# Patient Record
Sex: FEMALE | Race: WHITE | NOT HISPANIC OR LATINO | Employment: OTHER | URBAN - METROPOLITAN AREA
[De-identification: names, ages, dates, MRNs, and addresses within clinical notes are randomized per-mention and may not be internally consistent; named-entity substitution may affect disease eponyms.]

---

## 2017-02-03 DIAGNOSIS — E03.9 HYPOTHYROIDISM, UNSPECIFIED TYPE: ICD-10-CM

## 2017-02-03 DIAGNOSIS — E11.9 TYPE 2 DIABETES MELLITUS WITHOUT COMPLICATION, WITHOUT LONG-TERM CURRENT USE OF INSULIN (HCC): ICD-10-CM

## 2017-02-03 RX ORDER — CYCLOBENZAPRINE HCL 10 MG
10 TABLET ORAL 3 TIMES DAILY PRN
Qty: 90 TAB | Refills: 1 | Status: SHIPPED | OUTPATIENT
Start: 2017-02-03 | End: 2017-03-02 | Stop reason: SDUPTHER

## 2017-02-03 RX ORDER — GLIPIZIDE 5 MG/1
5 TABLET ORAL 2 TIMES DAILY
Qty: 180 TAB | Refills: 1 | Status: SHIPPED | OUTPATIENT
Start: 2017-02-03 | End: 2017-06-23

## 2017-02-03 NOTE — TELEPHONE ENCOUNTER
Refill done.  Pt needs to have fasting labs done and schedule a follow up appointment.  Labs ordered.  Kang Lofton M.D.

## 2017-02-03 NOTE — TELEPHONE ENCOUNTER
Was the patient seen in the last year in this department? No 9/16/2015    Does patient have an active prescription for medications requested? No     Received Request Via: Pharmacy

## 2017-02-03 NOTE — TELEPHONE ENCOUNTER
From: Mrogan Solis  To: Kang Lofton M.D.  Sent: 2/3/2017 12:47 PM PST  Subject: Medication Renewal Request    Original authorizing provider: KHUSHI Silver would like a refill of the following medications:  glipiZIDE (GLUCOTROL) 5 MG Tab [Kang Lofton M.D.]  cyclobenzaprine (FLEXERIL) 10 MG Tab [Kang Lofton M.D.]    Preferred pharmacy: [facility] [address] [facility] ([place] [place] [address]    Comment:

## 2017-03-02 ENCOUNTER — PATIENT MESSAGE (OUTPATIENT)
Dept: MEDICAL GROUP | Facility: MEDICAL CENTER | Age: 72
End: 2017-03-02

## 2017-03-02 DIAGNOSIS — M51.36 DDD (DEGENERATIVE DISC DISEASE), LUMBAR: ICD-10-CM

## 2017-03-02 RX ORDER — TRAMADOL HYDROCHLORIDE 50 MG/1
50 TABLET ORAL 2 TIMES DAILY PRN
Qty: 120 TAB | Refills: 2 | Status: SHIPPED
Start: 2017-03-02 | End: 2019-01-24

## 2017-03-02 RX ORDER — CYCLOBENZAPRINE HCL 10 MG
10 TABLET ORAL 3 TIMES DAILY PRN
Qty: 90 TAB | Refills: 2 | Status: ON HOLD | OUTPATIENT
Start: 2017-03-02 | End: 2017-07-06

## 2017-03-02 NOTE — TELEPHONE ENCOUNTER
Was the patient seen in the last year in this department? no    Does patient have an active prescription for medications requested? No     Received Request Via: Patient

## 2017-03-02 NOTE — TELEPHONE ENCOUNTER
From: Morgan Solis  To: Kang Lofton M.D.  Sent: 3/2/2017 8:21 AM PST  Subject: Medication Renewal Request    Original authorizing provider: KHUSHI Silver would like a refill of the following medications:  tramadol (ULTRAM) 50 MG Tab [Kang Lofton M.D.]    Preferred pharmacy: [facility] - [place] ([place]), [place] - [address]    Comment:

## 2017-03-28 ENCOUNTER — OFFICE VISIT (OUTPATIENT)
Dept: MEDICAL GROUP | Facility: MEDICAL CENTER | Age: 72
End: 2017-03-28
Payer: MEDICARE

## 2017-03-28 VITALS
HEART RATE: 106 BPM | RESPIRATION RATE: 16 BRPM | BODY MASS INDEX: 31.34 KG/M2 | TEMPERATURE: 96.4 F | OXYGEN SATURATION: 92 % | HEIGHT: 66 IN | WEIGHT: 195 LBS | DIASTOLIC BLOOD PRESSURE: 88 MMHG | SYSTOLIC BLOOD PRESSURE: 138 MMHG

## 2017-03-28 DIAGNOSIS — I10 ESSENTIAL HYPERTENSION: ICD-10-CM

## 2017-03-28 DIAGNOSIS — F51.01 PRIMARY INSOMNIA: ICD-10-CM

## 2017-03-28 DIAGNOSIS — E78.00 PURE HYPERCHOLESTEROLEMIA: ICD-10-CM

## 2017-03-28 DIAGNOSIS — E03.9 HYPOTHYROIDISM, UNSPECIFIED TYPE: ICD-10-CM

## 2017-03-28 DIAGNOSIS — E11.9 TYPE 2 DIABETES MELLITUS WITHOUT COMPLICATION, WITHOUT LONG-TERM CURRENT USE OF INSULIN (HCC): ICD-10-CM

## 2017-03-28 DIAGNOSIS — M25.552 LEFT HIP PAIN: ICD-10-CM

## 2017-03-28 PROCEDURE — 99214 OFFICE O/P EST MOD 30 MIN: CPT | Performed by: FAMILY MEDICINE

## 2017-03-28 PROCEDURE — 1101F PT FALLS ASSESS-DOCD LE1/YR: CPT | Performed by: FAMILY MEDICINE

## 2017-03-28 PROCEDURE — 1036F TOBACCO NON-USER: CPT | Performed by: FAMILY MEDICINE

## 2017-03-28 PROCEDURE — 4040F PNEUMOC VAC/ADMIN/RCVD: CPT | Mod: 8P | Performed by: FAMILY MEDICINE

## 2017-03-28 PROCEDURE — 3014F SCREEN MAMMO DOC REV: CPT | Mod: 8P | Performed by: FAMILY MEDICINE

## 2017-03-28 PROCEDURE — G8417 CALC BMI ABV UP PARAM F/U: HCPCS | Performed by: FAMILY MEDICINE

## 2017-03-28 PROCEDURE — 3046F HEMOGLOBIN A1C LEVEL >9.0%: CPT | Mod: 8P | Performed by: FAMILY MEDICINE

## 2017-03-28 PROCEDURE — G8484 FLU IMMUNIZE NO ADMIN: HCPCS | Performed by: FAMILY MEDICINE

## 2017-03-28 PROCEDURE — G8432 DEP SCR NOT DOC, RNG: HCPCS | Performed by: FAMILY MEDICINE

## 2017-03-28 RX ORDER — LISINOPRIL 10 MG/1
10 TABLET ORAL DAILY
Qty: 90 TAB | Refills: 1 | Status: SHIPPED | OUTPATIENT
Start: 2017-03-28 | End: 2017-06-23

## 2017-03-28 RX ORDER — TRAZODONE HYDROCHLORIDE 50 MG/1
50 TABLET ORAL
Qty: 90 TAB | Refills: 1 | Status: SHIPPED | OUTPATIENT
Start: 2017-03-28 | End: 2017-06-23

## 2017-03-28 RX ORDER — LEVOTHYROXINE SODIUM 0.05 MG/1
TABLET ORAL
Qty: 90 TAB | Refills: 0 | Status: SHIPPED | OUTPATIENT
Start: 2017-03-28 | End: 2019-01-24

## 2017-03-28 RX ORDER — ATORVASTATIN CALCIUM 20 MG/1
20 TABLET, FILM COATED ORAL
Qty: 90 TAB | Refills: 1 | Status: SHIPPED | OUTPATIENT
Start: 2017-03-28 | End: 2018-05-04 | Stop reason: SDUPTHER

## 2017-03-28 ASSESSMENT — PATIENT HEALTH QUESTIONNAIRE - PHQ9: CLINICAL INTERPRETATION OF PHQ2 SCORE: 0

## 2017-03-28 NOTE — ASSESSMENT & PLAN NOTE
Patient is on levothyroxine 50 mg daily, but she admits to skipping the medication occasionally in the morning, because she forgets.

## 2017-03-28 NOTE — ASSESSMENT & PLAN NOTE
July 6th she is having her hip replaced.  Had a new set of x-ray, which showed advanced osteoarthritis.  She is seeing Dr. Jason Zepeda.

## 2017-03-28 NOTE — ASSESSMENT & PLAN NOTE
Patient has not had follow-up blood work in almost 2 years. At that time her A1c was 7.4.  Patient states she takes metformin glipizide only when her blood sugar is over 150, which is about one or 2 times a week. There are times where she needs it more often when she gets cortisone injections from her pain specialist.  Patient denies any side effects from diabetes.

## 2017-03-28 NOTE — MR AVS SNAPSHOT
"Morgan Solis   3/28/2017 10:00 AM   Office Visit   MRN: [id number]    Department:  [facility]   Dept Phone:  [phone]    Description:  Female : 1945   Provider:  Kang Lofton M.D.           Reason for Visit     Blood Sugar Problem Follow up       Allergies as of 3/28/2017     Allergen Noted Reactions    Anesthetics, Sarah [Esters] 07/10/2015       Throat swelling and severe nasal discharge.  Will need atropine and benadryl.    Cephapirin 07/10/2015       Codeine 07/10/2015       Mobic [Meloxicam] 2017   Anxiety    Felt tingling in her extremities and anxiety.       You were diagnosed with     Type 2 diabetes mellitus without complication, without long-term current use of insulin (CMS-[facility])   [5381765]       Pure hypercholesterolemia   [272.0.ICD-9-CM]       Hypothyroidism, unspecified type   [1938711]       Essential hypertension   [5071291]       Left hip pain   [349141]       Primary insomnia   [835559]         Vital Signs     Blood Pressure Pulse Temperature Respirations Height Weight    138/88 mmHg 106 35.8 °C (96.4 °F) 16 1.676 m (5' 6\") 88.451 kg (195 lb)    Body Mass Index Oxygen Saturation Smoking Status             31.49 kg/m2 92% Former Smoker         Basic Information     Date Of Birth Sex Race Ethnicity Preferred Language    1945 Female White Non- English      Problem List              ICD-10-CM Priority Class Noted - Resolved    Hx of tobacco use, presenting hazards to health Z87.891   7/10/2015 - Present    Type 2 diabetes mellitus without complication (CMS-[facility]) E11.9   7/10/2015 - Present    Essential hypertension I10   7/10/2015 - Present    Hx of pancreatitis Z87.19   7/10/2015 - Present    DDD (degenerative disc disease), lumbar M51.36   7/10/2015 - Present    Left hip pain M25.552   7/10/2015 - Present    Obesity (BMI 30.0-34.9) E66.9   7/10/2015 - Present    Hypothyroidism E03.9   2015 - Present    Hyperlipidemia E78.5   2015 - Present "    Insomnia G47.00   6/30/2016 - Present      Health Maintenance        Date Due Completion Dates    DIABETES MONOFILAMENT / LE EXAM 5/26/1946 ---    RETINAL SCREENING 11/26/1965 ---    URINE ACR / MICROALBUMIN 11/26/1965 ---    IMM DTaP/Tdap/Td Vaccine (1 - Tdap) 11/26/1966 ---    IMM ZOSTER VACCINE 11/26/2007 ---    BONE DENSITY 11/26/2012 ---    IMM PNEUMOCOCCAL 65+ (ADULT) LOW/MEDIUM RISK SERIES (1 of 2 - PCV13) 11/26/2012 ---    A1C SCREENING 2/11/2016 8/11/2015    FASTING LIPID PROFILE 8/11/2016 8/11/2015    SERUM CREATININE 8/11/2016 8/11/2015    IMM INFLUENZA (1) 9/1/2016 10/27/2015    MAMMOGRAM 12/6/2017 (Originally 11/26/1987) ---    COLONOSCOPY 12/6/2017 (Originally 11/26/1997) ---            Current Immunizations     Influenza Vaccine Adult HD 10/27/2015      Below and/or attached are the medications your provider expects you to take. Review all of your home medications and newly ordered medications with your provider and/or pharmacist. Follow medication instructions as directed by your provider and/or pharmacist. Please keep your medication list with you and share with your provider. Update the information when medications are discontinued, doses are changed, or new medications (including over-the-counter products) are added; and carry medication information at all times in the event of emergency situations     Allergies:  ANESTHETICS, MIC - (reactions not documented)     CEPHAPIRIN - (reactions not documented)     CODEINE - (reactions not documented)     MOBIC - Anxiety               Medications  Valid as of: March 28, 2017 - 10:42 AM    Generic Name Brand Name Tablet Size Instructions for use    Atorvastatin Calcium (Tab) LIPITOR 20 MG Take 1 Tab by mouth every bedtime.        Blood Glucose Monitoring Suppl (Misc) Blood Glucose Monitoring Suppl SUPPLIES Test strips order: Test strips for One Touch Ultra 2 meter. Sig: use twice daily. Dx: E11.9        Cyclobenzaprine HCl (Tab) FLEXERIL 10 MG Take 1  Tab by mouth 3 times a day as needed for Moderate Pain or Muscle Spasms.        Diclofenac   Take  by mouth.        GlipiZIDE (Tab) GLUCOTROL 5 MG Take 1 Tab by mouth 2 times a day.        Lancets (Misc) Lancets  Lancets order: Lancets for One Touch Ultra 2 meter. Sig: use once daily. Dx: E11.9        Levothyroxine Sodium (Tab) SYNTHROID 50 MCG TAKE ONE TABLET BY MOUTH ONCE DAILY IN THE MORNING ON EMPTY STOMACH        Lisinopril (Tab) PRINIVIL 10 MG Take 1 Tab by mouth every day.        MetFORMIN HCl (Tab) GLUCOPHAGE 500 MG Take 1 Tab by mouth 2 times a day, with meals.        TraMADol HCl (Tab) ULTRAM 50 MG Take 1 Tab by mouth 2 times a day as needed for Severe Pain.        TraZODone HCl (Tab) DESYREL 50 MG Take 1 Tab by mouth at bedtime as needed for Sleep.        .                 Medicines prescribed today were sent to:     [facility] [address] [place] [address] (S), [place] [address] [place] [address] [facility] ([place]) [place] [address]    Phone: [phone] Fax: [phone]    Open 24 Hours?: No      Medication refill instructions:       If your prescription bottle indicates you have medication refills left, it is not necessary to call your provider’s office. Please contact your pharmacy and they will refill your medication.    If your prescription bottle indicates you do not have any refills left, you may request refills at any time through one of the following ways: The online [employer] system (except Urgent Care), by calling your provider’s office, or by asking your pharmacy to contact your provider’s office with a refill request. Medication refills are processed only during regular business hours and may not be available until the next business day. Your provider may request additional information or to have a follow-up visit with you prior to refilling your medication.   *Please Note: Medication refills are assigned a new Rx number when refilled electronically. Your pharmacy may indicate that no refills were authorized even  though a new prescription for the same medication is available at the pharmacy. Please request the medicine by name with the pharmacy before contacting your provider for a refill.        Your To Do List     Future Labs/Procedures Complete By Expires    COMP METABOLIC PANEL  As directed 3/29/2018    HEMOGLOBIN A1C  As directed 3/29/2018    LIPID PROFILE  As directed 3/29/2018    MICROALBUMIN CREAT RATIO URINE  As directed 3/29/2018    TSH WITH REFLEX TO FT4  As directed 3/28/2018         MyChart Access Code: Activation code not generated  Current [employer] Status: Active

## 2017-04-05 ENCOUNTER — PATIENT MESSAGE (OUTPATIENT)
Dept: MEDICAL GROUP | Facility: MEDICAL CENTER | Age: 72
End: 2017-04-05

## 2017-04-05 RX ORDER — FLUTICASONE PROPIONATE 50 MCG
2 SPRAY, SUSPENSION (ML) NASAL DAILY
Qty: 16 G | Refills: 3 | Status: SHIPPED | OUTPATIENT
Start: 2017-04-05 | End: 2017-06-23

## 2017-04-05 NOTE — TELEPHONE ENCOUNTER
From: Morgan Solis  To: Kang Lofton M.D.  Sent: 4/5/2017 11:49 AM PDT  Subject: Prescription Question    Dr. Lofton,    When I was in your office last week we discussed my taking Claratin for my allergy. Unfortunately, I have given the med a week and I am still suffering from sneezing, runny nose and sinus pressure. Is there anything else you can recommend?    BTW....I saw Dr. Cardenas yesterday and he gave me several cortisone injections. I expected the injections to affect my blood sugar level and it did. I am going to wait until my levels resume to normal to have the tests you requested.    Morgan

## 2017-04-27 ENCOUNTER — PATIENT MESSAGE (OUTPATIENT)
Dept: MEDICAL GROUP | Facility: MEDICAL CENTER | Age: 72
End: 2017-04-27

## 2017-04-27 DIAGNOSIS — R30.0 DYSURIA: ICD-10-CM

## 2017-04-27 RX ORDER — SULFAMETHOXAZOLE AND TRIMETHOPRIM 800; 160 MG/1; MG/1
1 TABLET ORAL 2 TIMES DAILY
Qty: 10 TAB | Refills: 0 | Status: SHIPPED | OUTPATIENT
Start: 2017-04-27 | End: 2017-05-02

## 2017-05-09 ENCOUNTER — TELEPHONE (OUTPATIENT)
Dept: MEDICAL GROUP | Facility: MEDICAL CENTER | Age: 72
End: 2017-05-09

## 2017-05-09 DIAGNOSIS — E11.9 TYPE 2 DIABETES MELLITUS WITHOUT COMPLICATION, WITHOUT LONG-TERM CURRENT USE OF INSULIN (HCC): ICD-10-CM

## 2017-05-09 RX ORDER — LANCETS 30 GAUGE
EACH MISCELLANEOUS
Qty: 100 EACH | Refills: 3 | Status: ON HOLD | OUTPATIENT
Start: 2017-05-09 | End: 2017-07-06

## 2017-05-09 NOTE — TELEPHONE ENCOUNTER
1. Caller Name: Elisa with [employer]                      Call Back Number: [phone]    2. Message: [employer] called stating the preferred formulary for test strips are Countour, Freestyle, or Precision. Is there a reason why pt needs One Touch?    3. Patient approves office to leave a detailed voicemail/MyChart message: yes

## 2017-05-09 NOTE — TELEPHONE ENCOUNTER
Please notify patient that her insurance preferred glucometer is contour. Is it possible if we send in a new meter, strips and lancets?  Kang Lofton M.D.

## 2017-05-09 NOTE — TELEPHONE ENCOUNTER
Pt states she is willing to switch to Contour. She would like new meter, strips and lancets sent to [facility].

## 2017-05-30 ENCOUNTER — TELEPHONE (OUTPATIENT)
Dept: HEALTH INFORMATION MANAGEMENT | Facility: OTHER | Age: 72
End: 2017-05-30

## 2017-05-30 ENCOUNTER — PATIENT OUTREACH (OUTPATIENT)
Dept: HEALTH INFORMATION MANAGEMENT | Facility: OTHER | Age: 72
End: 2017-05-30

## 2017-05-30 RX ORDER — TURMERIC ROOT EXTRACT 500 MG
1 TABLET ORAL
COMMUNITY
End: 2019-06-04

## 2017-05-30 RX ORDER — KRILL/OM-3/DHA/EPA/PHOSPHO/AST 500MG-86MG
1 CAPSULE ORAL
COMMUNITY
End: 2019-06-04

## 2017-05-30 NOTE — TELEPHONE ENCOUNTER
Dear Dr. Lofton,     I had the pleasure to review medications with your patient Morgan. Morgan would like your input on the recommendation of taking low-dose aspirin daily for primary prevention of cardiovascular disease. Please advise.     Thank you,     Ronel Coates, PHARMD  [facility] Clinical Pharmacist  [address] [place] [address] [id number] [phone]

## 2017-05-30 NOTE — PROGRESS NOTES
Outbound call to Morgan for medication reconciliation.    Updated allergy and medication lists.    Patient demonstrates partial adherence to medication schedule and understanding of indications for medications. Morgan states that she only takes glipizide when her blood sugars are over 120. She monitors her blood sugars in the mornings and afternoons. She states readings have been around 103-110. She is hesitant to take glipizide regularly due to hypoglycemia. Patient is not interested in diabetic education classes at this time. She had taken a class in [place].   Patient only takes lisinopril if her BP > 135/85. I advised patient that lisinopril is important to take for DM for kidney protection. If BP is too low, PCP can consider reducing dosage. Patient monitors BP once daily and reports readings <120/80. Most recent home reading was 103/66.    Morgan reports missing her thyroid medication 2 to 3 times weekly because it has to be taken on empty stomach. I advised patient it is best to take 30 to 60 minutes before breakfast but could also be taken 2 hours after a meal.      Medications that meet Beer's criteria for potentially inappropriate use in patients over 65 include:  Cyclobenzaprine - patient reports taking for hip pain. She typically takes one at bedtime but uses up to three times daily if having significant muscle pain. She denies dizziness or drowsiness.   She is hoping to be off of the cyclobenzaprine, tramadol, and diclofenac after having hip surgery which is scheduled 7/6/17.     Morgan states that she does not like to take trazodone unless she absolutely has to. She states that she feels groggy the next day. She reports using this about once weekly. I recommended patient can try melatonin 3 mg about 30-60 minutes before bed.     Patient feels satisfied with medication regimen.      Patient can afford her medications.    Morgan does not use tobacco.     Patient has had tetanus vaccination in  last 10 years, discussed patient getting tdap for next booster dose.     Calculated CrCl = 74.2 mL/min. Medications dosed appropriately.     Plan:    Recommend Prevnar and shingles vaccinations. Patient is amenable.     Discussed importance of taking medications as prescribed. Reviewed with patient signs/symptoms of hypoglycemia and to treat with a form of glucose if FBS <70.      Advised patient to get lab work that was ordered by PCP. Patient states she plans on getting in the next week or two.     Sent message to PCP to consider addition of low-dose aspirin for primary prevention of cardiovascular disease.     Discussed sleep hygiene techniques and trial of melatonin for insomnia.     Ronel Coates, PHARMD

## 2017-05-31 ENCOUNTER — TELEPHONE (OUTPATIENT)
Dept: MEDICAL GROUP | Facility: MEDICAL CENTER | Age: 72
End: 2017-05-31

## 2017-05-31 NOTE — TELEPHONE ENCOUNTER
Please call patient to recommend aspirin 81 mg daily to prevent against heart attack and stroke. She should take the aspirin with food.  Kang Lofton M.D.

## 2017-06-09 ENCOUNTER — PATIENT OUTREACH (OUTPATIENT)
Dept: HEALTH INFORMATION MANAGEMENT | Facility: OTHER | Age: 72
End: 2017-06-09

## 2017-06-09 NOTE — PROGRESS NOTES
Received notification from Dr. Lofton to recommend patient start taking aspirin 81 mg daily to prevent against stroke and heart attack. Outbound call to Morgan. Morgan was unavailable. Left my contact information, requesting patient to call back.     Ronel Coates, NICKD

## 2017-06-09 NOTE — PROGRESS NOTES
Morgan returned call and verified that she has begun taking ASA 81 mg daily.     Ronel Coates, PHARMD

## 2017-06-23 DIAGNOSIS — Z01.810 PRE-OPERATIVE CARDIOVASCULAR EXAMINATION: ICD-10-CM

## 2017-06-23 DIAGNOSIS — Z01.812 PRE-OPERATIVE LABORATORY EXAMINATION: ICD-10-CM

## 2017-06-23 LAB
ANION GAP SERPL CALC-SCNC: 7 MMOL/L (ref 0–11.9)
APPEARANCE UR: ABNORMAL
BACTERIA #/AREA URNS HPF: ABNORMAL /HPF
BASOPHILS # BLD AUTO: 1.9 % (ref 0–1.8)
BASOPHILS # BLD: 0.15 K/UL (ref 0–0.12)
BILIRUB UR QL STRIP.AUTO: NEGATIVE
BUN SERPL-MCNC: 20 MG/DL (ref 8–22)
CALCIUM SERPL-MCNC: 9.6 MG/DL (ref 8.5–10.5)
CHLORIDE SERPL-SCNC: 102 MMOL/L (ref 96–112)
CO2 SERPL-SCNC: 27 MMOL/L (ref 20–33)
COLOR UR: YELLOW
CREAT SERPL-MCNC: 0.82 MG/DL (ref 0.5–1.4)
CULTURE IF INDICATED INDCX: YES UA CULTURE
EKG IMPRESSION: NORMAL
EOSINOPHIL # BLD AUTO: 0.49 K/UL (ref 0–0.51)
EOSINOPHIL NFR BLD: 6.1 % (ref 0–6.9)
EPI CELLS #/AREA URNS HPF: ABNORMAL /HPF
ERYTHROCYTE [DISTWIDTH] IN BLOOD BY AUTOMATED COUNT: 41 FL (ref 35.9–50)
GFR SERPL CREATININE-BSD FRML MDRD: >60 ML/MIN/1.73 M 2
GLUCOSE SERPL-MCNC: 286 MG/DL (ref 65–99)
GLUCOSE UR STRIP.AUTO-MCNC: >1000 MG/DL
HCT VFR BLD AUTO: 41.4 % (ref 37–47)
HGB BLD-MCNC: 13.8 G/DL (ref 12–16)
HIV 1+2 AB+HIV1 P24 AG SERPL QL IA: NON REACTIVE
IMM GRANULOCYTES # BLD AUTO: 0.03 K/UL (ref 0–0.11)
IMM GRANULOCYTES NFR BLD AUTO: 0.4 % (ref 0–0.9)
KETONES UR STRIP.AUTO-MCNC: ABNORMAL MG/DL
LEUKOCYTE ESTERASE UR QL STRIP.AUTO: ABNORMAL
LYMPHOCYTES # BLD AUTO: 2.15 K/UL (ref 1–4.8)
LYMPHOCYTES NFR BLD: 26.7 % (ref 22–41)
MCH RBC QN AUTO: 30.4 PG (ref 27–33)
MCHC RBC AUTO-ENTMCNC: 33.3 G/DL (ref 33.6–35)
MCV RBC AUTO: 91.2 FL (ref 81.4–97.8)
MICRO URNS: ABNORMAL
MONOCYTES # BLD AUTO: 0.55 K/UL (ref 0–0.85)
MONOCYTES NFR BLD AUTO: 6.8 % (ref 0–13.4)
MUCOUS THREADS #/AREA URNS HPF: ABNORMAL /HPF
NEUTROPHILS # BLD AUTO: 4.69 K/UL (ref 2–7.15)
NEUTROPHILS NFR BLD: 58.1 % (ref 44–72)
NITRITE UR QL STRIP.AUTO: NEGATIVE
NRBC # BLD AUTO: 0 K/UL
NRBC BLD AUTO-RTO: 0 /100 WBC
PH UR STRIP.AUTO: 6 [PH]
PLATELET # BLD AUTO: 312 K/UL (ref 164–446)
PMV BLD AUTO: 9.5 FL (ref 9–12.9)
POTASSIUM SERPL-SCNC: 5 MMOL/L (ref 3.6–5.5)
PROT UR QL STRIP: 30 MG/DL
RBC # BLD AUTO: 4.54 M/UL (ref 4.2–5.4)
RBC # URNS HPF: ABNORMAL /HPF
RBC UR QL AUTO: ABNORMAL
SCCMEC + MECA PNL NOSE NAA+PROBE: NEGATIVE
SCCMEC + MECA PNL NOSE NAA+PROBE: NEGATIVE
SODIUM SERPL-SCNC: 136 MMOL/L (ref 135–145)
SP GR UR STRIP.AUTO: 1.02
WBC # BLD AUTO: 8.1 K/UL (ref 4.8–10.8)
WBC #/AREA URNS HPF: ABNORMAL /HPF

## 2017-06-23 PROCEDURE — 87086 URINE CULTURE/COLONY COUNT: CPT

## 2017-06-23 PROCEDURE — 87640 STAPH A DNA AMP PROBE: CPT | Mod: 59

## 2017-06-23 PROCEDURE — 81001 URINALYSIS AUTO W/SCOPE: CPT

## 2017-06-23 PROCEDURE — 87186 SC STD MICRODIL/AGAR DIL: CPT

## 2017-06-23 PROCEDURE — 93010 ELECTROCARDIOGRAM REPORT: CPT | Performed by: INTERNAL MEDICINE

## 2017-06-23 PROCEDURE — 87389 HIV-1 AG W/HIV-1&-2 AB AG IA: CPT

## 2017-06-23 PROCEDURE — 85025 COMPLETE CBC W/AUTO DIFF WBC: CPT

## 2017-06-23 PROCEDURE — 87641 MR-STAPH DNA AMP PROBE: CPT

## 2017-06-23 PROCEDURE — 36415 COLL VENOUS BLD VENIPUNCTURE: CPT

## 2017-06-23 PROCEDURE — 87077 CULTURE AEROBIC IDENTIFY: CPT

## 2017-06-23 PROCEDURE — 93005 ELECTROCARDIOGRAM TRACING: CPT

## 2017-06-23 PROCEDURE — 80048 BASIC METABOLIC PNL TOTAL CA: CPT

## 2017-06-23 RX ORDER — LISINOPRIL 10 MG/1
10 TABLET ORAL
COMMUNITY
End: 2018-09-27

## 2017-06-23 RX ORDER — DICLOFENAC SODIUM 75 MG/1
75 TABLET, DELAYED RELEASE ORAL 2 TIMES DAILY
COMMUNITY
End: 2019-06-04

## 2017-06-23 RX ORDER — GLIPIZIDE 5 MG/1
5 TABLET ORAL 2 TIMES DAILY PRN
COMMUNITY
End: 2018-05-04

## 2017-06-23 NOTE — OR NURSING
"Pt here to register for surgery on 7-6-17. Pt states that she is allergic to anesthetics and that it causes throat swelling and severe sinus draining. \"I feel like I am drowning.\" Pt states that she needs atropine and benadryl in pre op prior to surgery and that intubation must be through mouth and not nose. [facility] notified.   "

## 2017-06-23 NOTE — DISCHARGE PLANNING
DISCHARGE PLANNING NOTE - TOTAL JOINT     Procedure: Procedure(s):  HIP ARTHROPLASTY ANTERIOR TOTAL  Procedure Date: 7/6/2017  Insurance:  Payor: SENIOR CARE PLUS / Plan: SENIOR CARE PLUS  Equipment currently available at home? None  Steps into the home? 2 with pole nearby to assist  Steps within the home? 0  Toilet height? Standard  Type of shower? walk-in shower  Who will be with you during your recovery? other: Daughter  Is Outpatient Physical Therapy set up after surgery? No   Did you take the Total Joint Class and where? Yes, at [facility]     Plan: Patient does not want to purchase equipment. Reviewed Equipment Resource list and advised her to go to [facility] for a FWW, and shower chair. There are no other anticipated discharge needs.

## 2017-06-25 LAB
BACTERIA UR CULT: ABNORMAL
BACTERIA UR CULT: ABNORMAL
SIGNIFICANT IND 70042: ABNORMAL
SITE SITE: ABNORMAL
SOURCE SOURCE: ABNORMAL

## 2017-07-06 ENCOUNTER — APPOINTMENT (OUTPATIENT)
Dept: RADIOLOGY | Facility: MEDICAL CENTER | Age: 72
DRG: 470 | End: 2017-07-06
Attending: ORTHOPAEDIC SURGERY
Payer: MEDICARE

## 2017-07-06 ENCOUNTER — HOSPITAL ENCOUNTER (INPATIENT)
Facility: MEDICAL CENTER | Age: 72
LOS: 1 days | DRG: 470 | End: 2017-07-07
Attending: ORTHOPAEDIC SURGERY | Admitting: ORTHOPAEDIC SURGERY
Payer: MEDICARE

## 2017-07-06 PROBLEM — M16.12 OSTEOARTHRITIS OF LEFT HIP: Status: ACTIVE | Noted: 2017-07-06

## 2017-07-06 LAB
GLUCOSE BLD-MCNC: 148 MG/DL (ref 65–99)
GLUCOSE BLD-MCNC: 170 MG/DL (ref 65–99)
GLUCOSE BLD-MCNC: 171 MG/DL (ref 65–99)
GLUCOSE BLD-MCNC: 229 MG/DL (ref 65–99)

## 2017-07-06 PROCEDURE — 770001 HCHG ROOM/CARE - MED/SURG/GYN PRIV*

## 2017-07-06 PROCEDURE — A6402 STERILE GAUZE <= 16 SQ IN: HCPCS | Performed by: ORTHOPAEDIC SURGERY

## 2017-07-06 PROCEDURE — 500811 HCHG LENS/HOOD FOR SPACESUIT: Performed by: ORTHOPAEDIC SURGERY

## 2017-07-06 PROCEDURE — 0SRB02Z REPLACEMENT OF LEFT HIP JOINT WITH METAL ON POLYETHYLENE SYNTHETIC SUBSTITUTE, OPEN APPROACH: ICD-10-PCS | Performed by: ORTHOPAEDIC SURGERY

## 2017-07-06 PROCEDURE — 97162 PT EVAL MOD COMPLEX 30 MIN: CPT

## 2017-07-06 PROCEDURE — 501838 HCHG SUTURE GENERAL: Performed by: ORTHOPAEDIC SURGERY

## 2017-07-06 PROCEDURE — 501487 HCHG STRYKER TIP: Performed by: ORTHOPAEDIC SURGERY

## 2017-07-06 PROCEDURE — 700101 HCHG RX REV CODE 250

## 2017-07-06 PROCEDURE — G8979 MOBILITY GOAL STATUS: HCPCS | Mod: CI

## 2017-07-06 PROCEDURE — 502000 HCHG MISC OR IMPLANTS RC 0278: Performed by: ORTHOPAEDIC SURGERY

## 2017-07-06 PROCEDURE — 502578 HCHG PACK, TOTAL HIP: Performed by: ORTHOPAEDIC SURGERY

## 2017-07-06 PROCEDURE — 700111 HCHG RX REV CODE 636 W/ 250 OVERRIDE (IP)

## 2017-07-06 PROCEDURE — 700101 HCHG RX REV CODE 250: Performed by: ORTHOPAEDIC SURGERY

## 2017-07-06 PROCEDURE — 500002 HCHG ADHESIVE, DERMABOND: Performed by: ORTHOPAEDIC SURGERY

## 2017-07-06 PROCEDURE — 160002 HCHG RECOVERY MINUTES (STAT): Performed by: ORTHOPAEDIC SURGERY

## 2017-07-06 PROCEDURE — 700112 HCHG RX REV CODE 229: Performed by: ORTHOPAEDIC SURGERY

## 2017-07-06 PROCEDURE — 500088 HCHG BLADE, SAGITTAL: Performed by: ORTHOPAEDIC SURGERY

## 2017-07-06 PROCEDURE — 700102 HCHG RX REV CODE 250 W/ 637 OVERRIDE(OP)

## 2017-07-06 PROCEDURE — 160009 HCHG ANES TIME/MIN: Performed by: ORTHOPAEDIC SURGERY

## 2017-07-06 PROCEDURE — 82962 GLUCOSE BLOOD TEST: CPT

## 2017-07-06 PROCEDURE — A9270 NON-COVERED ITEM OR SERVICE: HCPCS

## 2017-07-06 PROCEDURE — 160035 HCHG PACU - 1ST 60 MINS PHASE I: Performed by: ORTHOPAEDIC SURGERY

## 2017-07-06 PROCEDURE — 160031 HCHG SURGERY MINUTES - 1ST 30 MINS LEVEL 5: Performed by: ORTHOPAEDIC SURGERY

## 2017-07-06 PROCEDURE — A9270 NON-COVERED ITEM OR SERVICE: HCPCS | Performed by: ORTHOPAEDIC SURGERY

## 2017-07-06 PROCEDURE — 502240 HCHG MISC OR SUPPLY RC 0272: Performed by: ORTHOPAEDIC SURGERY

## 2017-07-06 PROCEDURE — G8978 MOBILITY CURRENT STATUS: HCPCS | Mod: CJ

## 2017-07-06 PROCEDURE — 160042 HCHG SURGERY MINUTES - EA ADDL 1 MIN LEVEL 5: Performed by: ORTHOPAEDIC SURGERY

## 2017-07-06 PROCEDURE — 700102 HCHG RX REV CODE 250 W/ 637 OVERRIDE(OP): Performed by: ORTHOPAEDIC SURGERY

## 2017-07-06 PROCEDURE — 501486 HCHG STRYKER IRRIG SET HC W/TUBING: Performed by: ORTHOPAEDIC SURGERY

## 2017-07-06 PROCEDURE — 160036 HCHG PACU - EA ADDL 30 MINS PHASE I: Performed by: ORTHOPAEDIC SURGERY

## 2017-07-06 PROCEDURE — 700105 HCHG RX REV CODE 258: Performed by: ORTHOPAEDIC SURGERY

## 2017-07-06 PROCEDURE — 302135 SEQUENTIAL COMPRESSION MACHINE: Performed by: ORTHOPAEDIC SURGERY

## 2017-07-06 PROCEDURE — 160048 HCHG OR STATISTICAL LEVEL 1-5: Performed by: ORTHOPAEDIC SURGERY

## 2017-07-06 DEVICE — IMPLANTABLE DEVICE: Type: IMPLANTABLE DEVICE | Site: HIP | Status: FUNCTIONAL

## 2017-07-06 DEVICE — IMPLANT REF THREADED HOLE COVER (1EA): Type: IMPLANTABLE DEVICE | Site: HIP | Status: FUNCTIONAL

## 2017-07-06 DEVICE — IMPLANT R3 3 HOLE ACET SHELL 52MM (1EA): Type: IMPLANTABLE DEVICE | Site: HIP | Status: FUNCTIONAL

## 2017-07-06 DEVICE — IMPLANT OXINIUM FEM HD 12/14 32MM +4: Type: IMPLANTABLE DEVICE | Site: HIP | Status: FUNCTIONAL

## 2017-07-06 DEVICE — IMPLANT R3 0 DEG XLPE ACET LNR 32MM X 52MM: Type: IMPLANTABLE DEVICE | Site: HIP | Status: FUNCTIONAL

## 2017-07-06 DEVICE — IMPLANT REF SPHER HEAD SCREW 40MM (1EA): Type: IMPLANTABLE DEVICE | Site: HIP | Status: FUNCTIONAL

## 2017-07-06 DEVICE — IMPLANT REF SPHER HEAD SCREW 25MM (1EA): Type: IMPLANTABLE DEVICE | Site: HIP | Status: FUNCTIONAL

## 2017-07-06 RX ORDER — ATORVASTATIN CALCIUM 20 MG/1
20 TABLET, FILM COATED ORAL
Status: DISCONTINUED | OUTPATIENT
Start: 2017-07-06 | End: 2017-07-07 | Stop reason: HOSPADM

## 2017-07-06 RX ORDER — HALOPERIDOL 5 MG/ML
1 INJECTION INTRAMUSCULAR EVERY 6 HOURS PRN
Status: DISCONTINUED | OUTPATIENT
Start: 2017-07-06 | End: 2017-07-07 | Stop reason: HOSPADM

## 2017-07-06 RX ORDER — AMOXICILLIN 250 MG
1 CAPSULE ORAL
Status: DISCONTINUED | OUTPATIENT
Start: 2017-07-06 | End: 2017-07-07 | Stop reason: HOSPADM

## 2017-07-06 RX ORDER — DICLOFENAC SODIUM 75 MG/1
75 TABLET, DELAYED RELEASE ORAL 2 TIMES DAILY
Status: DISCONTINUED | OUTPATIENT
Start: 2017-07-07 | End: 2017-07-07 | Stop reason: HOSPADM

## 2017-07-06 RX ORDER — SODIUM CHLORIDE 9 MG/ML
INJECTION, SOLUTION INTRAVENOUS CONTINUOUS
Status: DISCONTINUED | OUTPATIENT
Start: 2017-07-06 | End: 2017-07-07 | Stop reason: HOSPADM

## 2017-07-06 RX ORDER — OXYCODONE HYDROCHLORIDE 5 MG/1
5 TABLET ORAL
Status: DISCONTINUED | OUTPATIENT
Start: 2017-07-06 | End: 2017-07-07 | Stop reason: HOSPADM

## 2017-07-06 RX ORDER — DOCUSATE SODIUM 100 MG/1
100 CAPSULE, LIQUID FILLED ORAL 2 TIMES DAILY
Status: DISCONTINUED | OUTPATIENT
Start: 2017-07-06 | End: 2017-07-07 | Stop reason: HOSPADM

## 2017-07-06 RX ORDER — DEXTROSE MONOHYDRATE 25 G/50ML
25 INJECTION, SOLUTION INTRAVENOUS
Status: DISCONTINUED | OUTPATIENT
Start: 2017-07-06 | End: 2017-07-07 | Stop reason: HOSPADM

## 2017-07-06 RX ORDER — LIDOCAINE HYDROCHLORIDE 10 MG/ML
INJECTION, SOLUTION INFILTRATION; PERINEURAL
Status: COMPLETED
Start: 2017-07-06 | End: 2017-07-06

## 2017-07-06 RX ORDER — CIPROFLOXACIN 500 MG/1
500 TABLET, FILM COATED ORAL 2 TIMES DAILY
COMMUNITY
Start: 2017-06-26 | End: 2018-05-04

## 2017-07-06 RX ORDER — CELECOXIB 200 MG/1
200 CAPSULE ORAL
COMMUNITY
End: 2019-06-04

## 2017-07-06 RX ORDER — POLYETHYLENE GLYCOL 3350 17 G/17G
1 POWDER, FOR SOLUTION ORAL 2 TIMES DAILY PRN
Status: DISCONTINUED | OUTPATIENT
Start: 2017-07-06 | End: 2017-07-07 | Stop reason: HOSPADM

## 2017-07-06 RX ORDER — SCOLOPAMINE TRANSDERMAL SYSTEM 1 MG/1
1 PATCH, EXTENDED RELEASE TRANSDERMAL
Status: DISCONTINUED | OUTPATIENT
Start: 2017-07-06 | End: 2017-07-07 | Stop reason: HOSPADM

## 2017-07-06 RX ORDER — CEFAZOLIN SODIUM 2 G/100ML
2 INJECTION, SOLUTION INTRAVENOUS EVERY 8 HOURS
Status: DISCONTINUED | OUTPATIENT
Start: 2017-07-06 | End: 2017-07-06

## 2017-07-06 RX ORDER — GABAPENTIN 600 MG/1
600 TABLET ORAL
COMMUNITY
End: 2019-06-04

## 2017-07-06 RX ORDER — DIPHENHYDRAMINE HYDROCHLORIDE 50 MG/ML
25 INJECTION INTRAMUSCULAR; INTRAVENOUS EVERY 6 HOURS PRN
Status: DISCONTINUED | OUTPATIENT
Start: 2017-07-06 | End: 2017-07-07 | Stop reason: HOSPADM

## 2017-07-06 RX ORDER — DEXAMETHASONE SODIUM PHOSPHATE 4 MG/ML
4 INJECTION, SOLUTION INTRA-ARTICULAR; INTRALESIONAL; INTRAMUSCULAR; INTRAVENOUS; SOFT TISSUE
Status: DISCONTINUED | OUTPATIENT
Start: 2017-07-06 | End: 2017-07-07 | Stop reason: HOSPADM

## 2017-07-06 RX ORDER — DEXAMETHASONE SODIUM PHOSPHATE 4 MG/ML
4 INJECTION, SOLUTION INTRA-ARTICULAR; INTRALESIONAL; INTRAMUSCULAR; INTRAVENOUS; SOFT TISSUE ONCE
Status: COMPLETED | OUTPATIENT
Start: 2017-07-07 | End: 2017-07-07

## 2017-07-06 RX ORDER — DIPHENHYDRAMINE HCL 25 MG
25 TABLET ORAL NIGHTLY PRN
Status: DISCONTINUED | OUTPATIENT
Start: 2017-07-07 | End: 2017-07-07 | Stop reason: HOSPADM

## 2017-07-06 RX ORDER — BISACODYL 10 MG
10 SUPPOSITORY, RECTAL RECTAL
Status: DISCONTINUED | OUTPATIENT
Start: 2017-07-06 | End: 2017-07-07 | Stop reason: HOSPADM

## 2017-07-06 RX ORDER — TAMSULOSIN HYDROCHLORIDE 0.4 MG/1
0.4 CAPSULE ORAL
Status: DISCONTINUED | OUTPATIENT
Start: 2017-07-06 | End: 2017-07-07 | Stop reason: HOSPADM

## 2017-07-06 RX ORDER — ACETAMINOPHEN 500 MG
1000 TABLET ORAL EVERY 6 HOURS
Status: DISCONTINUED | OUTPATIENT
Start: 2017-07-06 | End: 2017-07-07 | Stop reason: HOSPADM

## 2017-07-06 RX ORDER — ACETAMINOPHEN 650 MG
TABLET, EXTENDED RELEASE ORAL
Status: DISCONTINUED | OUTPATIENT
Start: 2017-07-06 | End: 2017-07-06 | Stop reason: HOSPADM

## 2017-07-06 RX ORDER — LIDOCAINE AND PRILOCAINE 25; 25 MG/G; MG/G
1 CREAM TOPICAL
Status: COMPLETED | OUTPATIENT
Start: 2017-07-06 | End: 2017-07-06

## 2017-07-06 RX ORDER — LISINOPRIL 10 MG/1
10 TABLET ORAL DAILY
Status: DISCONTINUED | OUTPATIENT
Start: 2017-07-07 | End: 2017-07-07 | Stop reason: HOSPADM

## 2017-07-06 RX ORDER — ENEMA 19; 7 G/133ML; G/133ML
1 ENEMA RECTAL
Status: DISCONTINUED | OUTPATIENT
Start: 2017-07-06 | End: 2017-07-07 | Stop reason: HOSPADM

## 2017-07-06 RX ORDER — AMOXICILLIN 250 MG
1 CAPSULE ORAL NIGHTLY
Status: DISCONTINUED | OUTPATIENT
Start: 2017-07-06 | End: 2017-07-07 | Stop reason: HOSPADM

## 2017-07-06 RX ORDER — CLINDAMYCIN PHOSPHATE 600 MG/50ML
600 INJECTION, SOLUTION INTRAVENOUS EVERY 8 HOURS
Status: COMPLETED | OUTPATIENT
Start: 2017-07-06 | End: 2017-07-07

## 2017-07-06 RX ORDER — ACETAMINOPHEN 500 MG
500 TABLET ORAL
COMMUNITY

## 2017-07-06 RX ORDER — GLIPIZIDE 5 MG/1
5 TABLET ORAL 2 TIMES DAILY PRN
Status: DISCONTINUED | OUTPATIENT
Start: 2017-07-07 | End: 2017-07-07 | Stop reason: HOSPADM

## 2017-07-06 RX ORDER — TRAMADOL HYDROCHLORIDE 50 MG/1
50 TABLET ORAL EVERY 4 HOURS PRN
Status: DISCONTINUED | OUTPATIENT
Start: 2017-07-06 | End: 2017-07-07 | Stop reason: HOSPADM

## 2017-07-06 RX ORDER — OXYCODONE HYDROCHLORIDE 5 MG/1
2.5 TABLET ORAL
Status: DISCONTINUED | OUTPATIENT
Start: 2017-07-06 | End: 2017-07-07 | Stop reason: HOSPADM

## 2017-07-06 RX ORDER — MAGNESIUM HYDROXIDE 1200 MG/15ML
LIQUID ORAL
Status: DISCONTINUED | OUTPATIENT
Start: 2017-07-06 | End: 2017-07-06 | Stop reason: HOSPADM

## 2017-07-06 RX ORDER — DIPHENHYDRAMINE HCL 25 MG
25 TABLET ORAL EVERY 6 HOURS PRN
Status: DISCONTINUED | OUTPATIENT
Start: 2017-07-06 | End: 2017-07-07 | Stop reason: HOSPADM

## 2017-07-06 RX ORDER — OXYCODONE HCL 5 MG/5 ML
SOLUTION, ORAL ORAL
Status: COMPLETED
Start: 2017-07-06 | End: 2017-07-06

## 2017-07-06 RX ORDER — ONDANSETRON 2 MG/ML
4 INJECTION INTRAMUSCULAR; INTRAVENOUS EVERY 4 HOURS PRN
Status: DISCONTINUED | OUTPATIENT
Start: 2017-07-06 | End: 2017-07-07 | Stop reason: HOSPADM

## 2017-07-06 RX ORDER — LEVOTHYROXINE SODIUM 0.03 MG/1
50 TABLET ORAL
Status: DISCONTINUED | OUTPATIENT
Start: 2017-07-07 | End: 2017-07-07 | Stop reason: HOSPADM

## 2017-07-06 RX ORDER — EPINEPHRINE 1 MG/ML(1)
AMPUL (ML) INJECTION
Status: DISCONTINUED | OUTPATIENT
Start: 2017-07-06 | End: 2017-07-06 | Stop reason: HOSPADM

## 2017-07-06 RX ORDER — LIDOCAINE HYDROCHLORIDE 10 MG/ML
0.5 INJECTION, SOLUTION INFILTRATION; PERINEURAL
Status: COMPLETED | OUTPATIENT
Start: 2017-07-06 | End: 2017-07-06

## 2017-07-06 RX ADMIN — ACETAMINOPHEN 1000 MG: 500 TABLET ORAL at 23:19

## 2017-07-06 RX ADMIN — OXYCODONE HYDROCHLORIDE 5 MG: 5 SOLUTION ORAL at 11:30

## 2017-07-06 RX ADMIN — ATORVASTATIN CALCIUM 20 MG: 20 TABLET, FILM COATED ORAL at 20:25

## 2017-07-06 RX ADMIN — ACETAMINOPHEN 1000 MG: 500 TABLET ORAL at 13:02

## 2017-07-06 RX ADMIN — STANDARDIZED SENNA CONCENTRATE AND DOCUSATE SODIUM 1 TABLET: 8.6; 5 TABLET, FILM COATED ORAL at 20:25

## 2017-07-06 RX ADMIN — CLINDAMYCIN IN 5 PERCENT DEXTROSE 600 MG: 12 INJECTION, SOLUTION INTRAVENOUS at 17:04

## 2017-07-06 RX ADMIN — ASPIRIN 81 MG: 81 TABLET ORAL at 23:19

## 2017-07-06 RX ADMIN — ACETAMINOPHEN 1000 MG: 500 TABLET ORAL at 17:04

## 2017-07-06 RX ADMIN — LIDOCAINE HYDROCHLORIDE 0.5 ML: 10 INJECTION, SOLUTION INFILTRATION; PERINEURAL at 08:40

## 2017-07-06 RX ADMIN — TRANEXAMIC ACID 1000 MG: 100 INJECTION, SOLUTION INTRAVENOUS at 13:02

## 2017-07-06 RX ADMIN — TAMSULOSIN HYDROCHLORIDE 0.4 MG: 0.4 CAPSULE ORAL at 13:02

## 2017-07-06 RX ADMIN — DOCUSATE SODIUM 100 MG: 100 CAPSULE ORAL at 20:25

## 2017-07-06 RX ADMIN — SODIUM CHLORIDE: 9 INJECTION, SOLUTION INTRAVENOUS at 08:40

## 2017-07-06 ASSESSMENT — COGNITIVE AND FUNCTIONAL STATUS - GENERAL
SUGGESTED CMS G CODE MODIFIER MOBILITY: CK
WALKING IN HOSPITAL ROOM: A LITTLE
MOBILITY SCORE: 18
STANDING UP FROM CHAIR USING ARMS: A LITTLE
CLIMB 3 TO 5 STEPS WITH RAILING: A LOT
MOVING FROM LYING ON BACK TO SITTING ON SIDE OF FLAT BED: A LITTLE
MOVING TO AND FROM BED TO CHAIR: A LITTLE

## 2017-07-06 ASSESSMENT — GAIT ASSESSMENTS
ASSISTIVE DEVICE: FRONT WHEEL WALKER
DEVIATION: DECREASED BASE OF SUPPORT
DISTANCE (FEET): 50
GAIT LEVEL OF ASSIST: CONTACT GUARD ASSIST

## 2017-07-06 ASSESSMENT — LIFESTYLE VARIABLES
ALCOHOL_USE: NO
EVER_SMOKED: YES
DO YOU DRINK ALCOHOL: NO

## 2017-07-06 ASSESSMENT — PAIN SCALES - WONG BAKER: WONGBAKER_NUMERICALRESPONSE: HURTS JUST A LITTLE BIT

## 2017-07-06 ASSESSMENT — PAIN SCALES - GENERAL
PAINLEVEL_OUTOF10: 0
PAINLEVEL_OUTOF10: 1
PAINLEVEL_OUTOF10: 0
PAINLEVEL_OUTOF10: 3

## 2017-07-06 NOTE — OP REPORT
DIAGNOSIS: Osteoarthritis, left hip.    PROCEDURE: left Total hip arthroplasty.    ANESTHESIA: General.    COMPLICATIONS: None.    SURGEON: Jason Zepeda MD.    ASSISTANT: Asim Garcia    INDICATIONS: This is a patient with severe osteoarthritis causing pain,   having failed conservative treatments.    DESCRIPTION OF PROCEDURE: Patient was identified in the preop area, site was   marked, taken back to the operating room and underwent general anesthesia.   left lower extremity was prepped and draped in sterile manner. Preoperative   timeout was held and antibiotics were given. Incision was made coming off the  ASIS. Soft tissue dissected down to fascia. Fascia was split in line with   the tensor. Tensor was retracted laterally. Deep fascia was incised and   vessels were ligated. A capsulotomy was performed and then an osteotomy of   the femoral neck. The acetabulum was then reamed up to a 52 and a 52 R3 cluster   hole cup by Smith and Nephew was placed. A liner was placed for a 32 head.   Osteophytes were debrided and then the femur was externally rotated and   extended. This was then broached up to a size 7, and a 7 standard offset polar stem  by Smith and Nephew was placed. Final trialing showed equal leg lengths with  a +4 head, the +4 32 Oxinium head by Smith and Nephew was placed. Wound was   soaked with dilute Betadine solution and was injected with Marcaine. Vicryl   was used for the fascia, Monocryl soft tissue skin and Dermabond for the final  skin layer. Patient was woken up, taken back to PACU, will be weightbear as   tolerated.

## 2017-07-06 NOTE — IP AVS SNAPSHOT
[employer] Access Code: Activation code not generated  Current [employer] Status: Active    [employer]  A secure, online tool to manage your health information     [employer]’s [employer]® is a secure, online tool that connects you to your personalized health information from the privacy of your home -- day or night - making it very easy for you to manage your healthcare. Once the activation process is completed, you can even access your medical information using the [employer] misti, which is available for free in the Apple Misti store or Google Play store.     [employer] provides the following levels of access (as shown below):   My Chart Features   [facility] Primary Care Doctor [facility]  Specialists [facility]  Urgent  Care Non-[facility]  Primary Care  Doctor   Email your healthcare team securely and privately 24/7 X X X X   Manage appointments: schedule your next appointment; view details of past/upcoming appointments X      Request prescription refills. X      View recent personal medical records, including lab and immunizations X X X X   View health record, including health history, allergies, medications X X X X   Read reports about your outpatient visits, procedures, consult and ER notes X X X X   See your discharge summary, which is a recap of your hospital and/or ER visit that includes your diagnosis, lab results, and care plan. X X       How to register for [employer]:  1. Go to  [email or web address].  2. Click on the Sign Up Now box, which takes you to the New Member Sign Up page. You will need to provide the following information:  a. Enter your [employer] Access Code exactly as it appears at the top of this page. (You will not need to use this code after you’ve completed the sign-up process. If you do not sign up before the expiration date, you must request a new code.)   b. Enter your date of birth.   c. Enter your home email address.   d. Click Submit, and follow the next screen’s instructions.  3. Create a [employer] ID. This will  be your [employer] login ID and cannot be changed, so think of one that is secure and easy to remember.  4. Create a [employer] password. You can change your password at any time.  5. Enter your Password Reset Question and Answer. This can be used at a later time if you forget your password.   6. Enter your e-mail address. This allows you to receive e-mail notifications when new information is available in [employer].  7. Click Sign Up. You can now view your health information.    For assistance activating your [employer] account, call [phone]

## 2017-07-06 NOTE — PROGRESS NOTES
Report received from Thania RN at 1145. Assumed care at 1205. Pt in bed T321. A/O x4. VSS. Responds appropriately. Denies SOB or chest pain. Pain is 0/10. Assessment complete. 2nd RN skin check done with Brock RN. No skin breakdown currently present. Discussed POC, pt verbalizes understanding. Pt has one bag of belongings with them. Her daughter is at bedside. Explained importance of calling before getting OOB. Call light and belongings within reach. Bed alarm is off. Bed in the lowest position. Treaded socks in place. Hourly rounding in progress.

## 2017-07-06 NOTE — PROGRESS NOTES
The Medication Reconciliation process has been completed by interviewing the patient    Allergies have been reviewed  Antibiotic use in 30 days - Cipro for UTI - completed 7/4/17    Home Pharmacy:  [facility] [place]

## 2017-07-06 NOTE — IP AVS SNAPSHOT
" <p align=\"LEFT\"><IMG SRC=\"//[email or web address]\" alt=\"Image\" WIDTH=\"50%\" HEIGHT=\"200\" BORDER=\"\"></p>                   Name:Morgan Solis  Medical Record Number:[id number]  CSN: [id number]    YOB: 1945   Age: 71 y.o.  Sex: female  HT:1.664 m (5' 5.51\") WT: 90.3 kg (199 lb 1.2 oz)          Admit Date: 7/6/2017     Discharge Date:   Today's Date: 7/7/2017  Attending Doctor:  Jason Zepeda M.D.                  Allergies:  Anesthetics, warner; Cephalosporins; Codeine; Keflex; and Mobic          Follow-up Information     1. Follow up with Jason Zepeda M.D.. Schedule an appointment as soon as possible for a visit in 2 weeks.    Specialty:  Orthopaedics    Why:  As needed, If symptoms worsen, For suture removal, For wound re-check    Contact information    [address]  [address]  [facility] [address]  [phone]           Medication List      Take these Medications        Instructions    * aspirin EC 81 MG Tbec   What changed:  Another medication with the same name was added. Make sure you understand how and when to take each.   Commonly known as:  ECOTRIN    Take 81 mg by mouth every morning.   Dose:  81 mg       * aspirin 81 MG EC tablet   What changed:  You were already taking a medication with the same name, and this prescription was added. Make sure you understand how and when to take each.    Take 1 Tab by mouth 2 times a day.   Dose:  81 mg       * tramadol 50 MG Tabs   What changed:  Another medication with the same name was added. Make sure you understand how and when to take each.   Commonly known as:  ULTRAM    Take 1 Tab by mouth 2 times a day as needed for Severe Pain.   Dose:  50 mg       * tramadol 50 MG Tabs   What changed:  You were already taking a medication with the same name, and this prescription was added. Make sure you understand how and when to take each.   Commonly known as:  ULTRAM    Take 1 Tab by mouth every four hours as needed (Moderate Pain (NRS Pain Scale 4-6; " CPOT Pain Scale 3-5) if opiates not ordered or tolerated).   Dose:  50 mg       * Notice:  This list has 4 medication(s) that are the same as other medications prescribed for you. Read the directions carefully, and ask your doctor or other care provider to review them with you.      Ask your Physician about these medications        Instructions    acetaminophen 500 MG Tabs   Commonly known as:  TYLENOL    Take 500 mg by mouth Pre-Op Once.   Dose:  500 mg       atorvastatin 20 MG Tabs   Commonly known as:  LIPITOR    Take 1 Tab by mouth every bedtime.   Dose:  20 mg       Biotin 5000 MCG Caps    Take 1 Capsule by mouth every bedtime.   Dose:  1 Capsule       celecoxib 200 MG Caps   Commonly known as:  CELEBREX    Take 200 mg by mouth Pre-Op Once.   Dose:  200 mg       ciprofloxacin 500 MG Tabs   Commonly known as:  CIPRO    Take 500 mg by mouth 2 times a day. 10 day course started 6/26/17   Dose:  500 mg       diclofenac EC 75 MG Tbec   Commonly known as:  VOLTAREN    Take 75 mg by mouth 2 times a day.   Dose:  75 mg       gabapentin 600 MG tablet   Commonly known as:  NEURONTIN    Take 600 mg by mouth Pre-Op Once.   Dose:  600 mg       glipiZIDE 5 MG Tabs   Commonly known as:  GLUCOTROL    Take 5 mg by mouth 2 times a day as needed (takes if blood sugar is greater 120).   Dose:  5 mg       Krill Oil 500 MG Caps    Take 1 Cap by mouth every bedtime.   Dose:  1 Cap       levothyroxine 50 MCG Tabs   Commonly known as:  SYNTHROID    TAKE ONE TABLET BY MOUTH ONCE DAILY IN THE MORNING ON EMPTY STOMACH       lisinopril 10 MG Tabs   Commonly known as:  PRINIVIL    Take 10 mg by mouth 1 time daily as needed (Pt only takes if blood pressure is 135/85 or higher.).   Dose:  10 mg       metformin 500 MG Tabs   Commonly known as:  GLUCOPHAGE    Take 1 Tab by mouth 2 times a day, with meals.   Dose:  500 mg       MULTIVITAMIN ADULT PO    Take 1 tablet by mouth every bedtime.   Dose:  1 tablet       Turmeric 500 MG Tabs    Take 1  tablet by mouth every bedtime.   Dose:  1 tablet

## 2017-07-06 NOTE — IP AVS SNAPSHOT
" Home Care Instructions                                                                                                                  Name:Morgan Solis  Medical Record Number:[id number]  CSN: [id number]    YOB: 1945   Age: 71 y.o.  Sex: female  HT:1.664 m (5' 5.51\") WT: 90.3 kg (199 lb 1.2 oz)          Admit Date: 7/6/2017     Discharge Date:   Today's Date: 7/7/2017  Attending Doctor:  Jason Zepeda M.D.                  Allergies:  Anesthetics, warner; Cephalosporins; Codeine; Keflex; and Mobic            Discharge Instructions       *Follow up with Dr. Zepeda at scheduled appointment  *Weight bearing as tolerated                      *Activity as tolerated  *Use assistive device for all activity  *Continue exercises provided by physical therapy  *Elevate leg as needed  *Ice as needed (20 minutes every 1-2 hours)  *Keep dressing in place until 07/11/2017 postoperative day #5   *Starting 7/11 remove dressing and shower. Do not soak or scrub incision, after shower pat dry and leave open to air.  *No soaking of the incision; no baths, hot tubs, or swimming until cleared by doctor  * aspirin 81 mg twice a day for blood clot prevention        *Take medications as prescribed by doctor  *Call doctor’s office with any questions or concerns     Discharge Instructions    Discharged to home by car with relative. Discharged via wheelchair, hospital escort: Yes.  Special equipment needed: Not Applicable    Be sure to schedule a follow-up appointment with your primary care doctor or any specialists as instructed.     Discharge Plan:   Diet Plan: Discussed  Activity Level: Discussed  Confirmed Follow up Appointment: Patient to Call and Schedule Appointment  Confirmed Symptoms Management: Discussed  Medication Reconciliation Updated: Yes  Influenza Vaccine Indication: Indicated: Not available from distributor/    I understand that a diet low in cholesterol, fat, and sodium is recommended for good " health. Unless I have been given specific instructions below for another diet, I accept this instruction as my diet prescription.   Other diet: Diabetic Diet    Special Instructions: Discharge instructions for the Orthopedic Patient    Follow up with Primary Care Physician within 2 weeks of discharge to home, regarding:  Review of medications and diagnostic testing.  Surveillance for medical complications.  Workup and treatment of osteoporosis, if appropriate.     -Is this a Joint Replacement patient? Yes   Total Joint Hip Replacement Discharge Instructions    Pain  - The goal is to slowly wean off the prescription pain medicine.  - Ice can be used for pain control.  20 minutes at a time is recommended, and never directly against your skin or incision.  - Most patients are off the pain pills by 3 weeks; others may require a low level of pain medications for many months. If your pain continues to be severe, follow up with your physician.  Infection  Deep hip joint infections that require removal of the prostheses occur in less than 0.1% of patients. Lesser infections in the skin (cellulites) are more common and much more easily treated.  - Keep the incision as clean and dry as possible.  - Always wash your hands before touching your incision.  - Skin infections tend to develop around 7-10 days after surgery, most can be treated with oral antibiotics.  - Dental Care should be delayed for 3 months after surgery, your surgeon recommends taking a dose of antibiotics 1 hour prior to any dental procedure.  After 2 years, most surgeons recommend antibiotics only before an extensive procedure.  Ask your surgeon what he recommends.  - Signs and symptoms of infection can include:  low grade fever, redness, pain, swelling and drainage from your incision.  Notify your surgeon immediately if you develop any of these symptoms.  Post op Disturbances  - Bowel habits - constipation is extremely common and is caused by a combination  of anesthesia, lack of mobility and pain medicine.  Use stool softeners or laxatives if necessary. It is important not to ignore this problem, as bowel obstructions can be a serious complication after joint replacement surgery.  - Mood/Energy Level - Many patients experience a lack of energy and endurance for up to 2-3 months after surgery.  Some may also feel down and can even become depressed.  This is likely due to the postoperative anemia, change in activity level, lack of sleep, pain medicine and just the emotional reaction to the surgery itself that is a big disruption in a person’s life.  This usually passes.  If symptoms persist, follow up with your primary physician.  - Returning to work - Your surgeon will give you more specific instructions.  Generally, if you work a sedentary job requiring little standing or walking, most patients may return within 2-6 weeks.  Manual labor jobs involving walking, lifting and standing may take 3-4 months.  Your surgeon’s office can provide a release to part-time or light duty work early on in your recovery and progress you to full duty as able.  - Driving - You can begin driving an automatic shift car in 4 to 8 weeks, provided you are no longer taking narcotic pain medication. If you have a stick-shift car and your right hip was replaced, do not begin driving until your doctor says you can.   - Avoiding falls -  throw rugs and tack down loose carpeting.  Be aware of floor hazards such as pets, small objects or uneven surfaces.   -  Airport Metal Detectors - The sensitivity of metal detectors varies and it is likely that your prosthesis will cause an alarm. Inform the  that you have an artificial joint.  Diet  - Resume your normal diet as tolerated.  - It is important to achieve a healthy nutritional status by eating a well balanced diet on a regular basis.  - Your physician may recommend that you take iron and vitamin supplements.   - Continue to  drink plenty of fluids.  Shower/Bathing  - You may shower as soon as you get home from the hospital unless otherwise instructed.  - Keep your incision out of water.  To keep the incision dry when showering, cover it with a plastic bag or plastic wrap.  - Pat incision dry if it gets wet.  Don’t rub.  - Do not submerge in a bath until staples are out and the incision is completely healed. (Approximately 6-8 weeks after surgery).  Dressing Change:  Procedure (if recommended by your physician)  - Wash hands.  - Open all dressing change materials.  - Remove old dressing and discard.  - Inspect incision for redness, increase in clear drainage, yellow/green drainage, odor and surrounding skin hot to touch.  -  ABD (large gauze) pad by one corner and lay over the incision.  Be careful not to touch the inside of the dressing that will lay over the incision.  - Secure in place as instructed (Ace wrap or tape).    Swelling/Bruising  - Swelling is normal after hip replacement and can involve the thigh, knee, calf and foot.  - Swelling can last from 3-6 months.  - Elevate your leg higher than your heart while reclining.  The first week you are home you should elevate your leg an equal amount of time, as you are active.    - Anti-inflammatory pills can be taken once you have stopped the blood thinners.  - The swelling is usually worse after you go home since you are upright for longer periods of time.  - Bruising is common and can involve the entire leg including the thigh, calf and even foot.  Bruising often does not appear until after you arrive home and it can be quite dramatic- purple, black, green.  The bruising you can see is not usually concerning and will subside without any treatment.      Blood Clot Prevention  Blood clots in the legs and the less common, but frightening, clots that travel to the lungs are a real focus of our preventative. Most patients are at standard risk for them, but those patients who are at  higher risk include people who have had previous clots, a family history of clotting, smoking, diabetes, obesity, advanced age, use of estrogen and a sedentary lifestyle.    - Signs of blood clots in legs - Swelling in thigh, calf or ankle that does not go down with elevation.  Pain, heat and tenderness in calf, back of calf or groin area.  NOTE: blood clots can occur in either leg.  - You have been receiving anticoagulant therapy (blood thinners) in the hospital and you may be instructed to continue at home depending on your risk factors.  - Your risk for developing a clot continues for up to 2-3 months after surgery.  You should avoid prolonged sitting and dehydration during that time (long air trips and car trips).  If you do take a trip during this time, please get up and move around every 1- 1.5 hours.  - If you are prescribed blood thinning medication for home, follow instructions as directed. (Handouts provided if applicable).      Activity    Once you get home, you should stay active. The key is not to overdo it! While you can expect some good days and some bad days, you should notice a gradual improvement over time you should notice a gradual improvement and a gradual increase in your endurance over the next 6 to 12 months.    - Weight Bearing - If you have undergone cemented or hybrid hip replacement, you can put some weight on the leg immediately using a cane or walker, and you should continue to use some support for 4 to 6 weeks to help the muscles recover.   - Sleeping Positions - Sleep on your back with your legs slightly apart or on your side with a regular pillow between your knees. Be sure to use the pillow for at least 6 weeks, or until your doctor says you can do without it. Sleeping on your stomach should be all right  - Sitting - For at least the first 3 months, sit only in chairs that have arms. Do not sit on low chairs, low stools, or reclining chairs. Do not cross your legs at the knees. The  physical therapist will show you how to sit and stand from a chair, keeping your affected leg out in front of you. Get up and move around on a regular basis--at least once every hour.  - Walking - Walk as much as you like once your doctor gives you the go-ahead, but remember that walking is no substitute for your prescribed exercises. Walking with a pair of trekking poles is helpful and adds as much as 40% to the exercise you get when you walk  - Therapy may be needed in some cases, to strengthen your muscles and improve your gait (walking pattern).  This decision will be made at your post-operative appointment.  Follow your therapist recommended post-operative exercises (handout provided by Therapist).  - Swimming is also recommended; you can begin as soon as the sutures have been removed and the wound is healed, approximately 6 to 8 weeks after surgery. Using a pair of training fins may make swimming a more enjoyable and effective exercise.  - Other activities - Lower impact activities are preferred.  If you have specific questions, consult your Surgeon.    - Sexual activity - Your surgeon can tell you when it should be safe to resume sexual activity.      When to Call the Doctor   Call the physician if:   - Fever over 100.5? F  - Increased pain, drainage, redness, odor or heat around the incision area  - Shaking chills  - Increased knee pain with activity and rest  - Increased pain in calf, tenderness or redness above or below the knee  - Increased swelling of calf, ankle, foot  - Sudden increased shortness of breath, sudden onset of chest pain, localized chest pain with coughing  - Incision opening  Or, if there are any questions or concerns about medications or care.       -Is this patient being discharged with medication to prevent blood clots?  Yes, Aspirin Aspirin, ASA oral tablets  What is this medicine?  ASPIRIN (AS pir in) is a pain reliever. It is used to treat mild pain and fever. This medicine is also  used as directed by a doctor to prevent and to treat heart attacks, to prevent strokes, and to treat arthritis or inflammation.  This medicine may be used for other purposes; ask your health care provider or pharmacist if you have questions.  COMMON BRAND NAME(S): Aspir-Low, Aspir-Sherry , Aspirtab , [place] Advanced Aspirin, [place] Aspirin Extra Strength, [place] Aspirin Plus, [place] Aspirin, [place] Genuine Aspirin, [place] Womens Aspirin , Bufferin Extra Strength, Bufferin Low Dose, Bufferin  What should I tell my health care provider before I take this medicine?  They need to know if you have any of these conditions:  -anemia  -asthma  -bleeding problems  -child with chickenpox, the flu, or other viral infection  -diabetes  -gout  -if you frequently drink alcohol containing drinks  -kidney disease  -liver disease  -low level of vitamin K  -lupus  -smoke tobacco  -stomach ulcers or other problems  -an unusual or allergic reaction to aspirin, tartrazine dye, other medicines, dyes, or preservatives  -pregnant or trying to get pregnant  -breast-feeding  How should I use this medicine?  Take this medicine by mouth with a glass of water. Follow the directions on the package or prescription label. You can take this medicine with or without food. If it upsets your stomach, take it with food. Do not take your medicine more often than directed.  Talk to your pediatrician regarding the use of this medicine in children. While this drug may be prescribed for children as young as 12 years of age for selected conditions, precautions do apply. Children and teenagers should not use this medicine to treat chicken pox or flu symptoms unless directed by a doctor.  Patients over 65 years old may have a stronger reaction and need a smaller dose.  Overdosage: If you think you have taken too much of this medicine contact a poison control center or emergency room at once.  NOTE: This medicine is only for you. Do not share this medicine with  others.  What if I miss a dose?  If you are taking this medicine on a regular schedule and miss a dose, take it as soon as you can. If it is almost time for your next dose, take only that dose. Do not take double or extra doses.  What may interact with this medicine?  Do not take this medicine with any of the following medications:  -cidofovir  -ketorolac  -probenecid  This medicine may also interact with the following medications:  -alcohol  -alendronate  -bismuth subsalicylate  -flavocoxid  -herbal supplements like feverfew, garlic, little, ginkgo biloba, horse chestnut  -medicines for diabetes or glaucoma like acetazolamide, methazolamide  -medicines for gout  -medicines that treat or prevent blood clots like enoxaparin, heparin, ticlopidine, warfarin  -other aspirin and aspirin-like medicines  -NSAIDs, medicines for pain and inflammation, like ibuprofen or naproxen  -pemetrexed  -sulfinpyrazone  -varicella live vaccine  This list may not describe all possible interactions. Give your health care provider a list of all the medicines, herbs, non-prescription drugs, or dietary supplements you use. Also tell them if you smoke, drink alcohol, or use illegal drugs. Some items may interact with your medicine.  What should I watch for while using this medicine?  If you are treating yourself for pain, tell your doctor or health care professional if the pain lasts more than 10 days, if it gets worse, or if there is a new or different kind of pain. Tell your doctor if you see redness or swelling. Also, check with your doctor if you have a fever that lasts for more than 3 days. Only take this medicine to prevent heart attacks or blood clotting if prescribed by your doctor or health care professional.  Do not take aspirin or aspirin-like medicines with this medicine. Too much aspirin can be dangerous. Always read the labels carefully.  This medicine can irritate your stomach or cause bleeding problems. Do not smoke cigarettes  or drink alcohol while taking this medicine. Do not lie down for 30 minutes after taking this medicine to prevent irritation to your throat.  If you are scheduled for any medical or dental procedure, tell your healthcare provider that you are taking this medicine. You may need to stop taking this medicine before the procedure.  What side effects may I notice from receiving this medicine?  Side effects that you should report to your doctor or health care professional as soon as possible:  -allergic reactions like skin rash, itching or hives, swelling of the face, lips, or tongue  -black, tarry stools  -bloody, coffee ground-like vomit  -breathing problems  -changes in hearing, ringing in the ears  -confusion  -general ill feeling or flu-like symptoms  -pain on swallowing  -redness, blistering, peeling or loosening of the skin, including inside the mouth or nose  -trouble passing urine or change in the amount of urine  -unusual bleeding or bruising  -unusually weak or tired  -yellowing of the eyes or skin  Side effects that usually do not require medical attention (report to your doctor or health care professional if they continue or are bothersome):  -diarrhea or constipation  -nausea, vomiting  -stomach gas, heartburn  This list may not describe all possible side effects. Call your doctor for medical advice about side effects. You may report side effects to FDA at 5-734-FDA-4008.  Where should I keep my medicine?  Keep out of the reach of children.  Store at room temperature between 15 and 30 degrees C (59 and 86 degrees F). Protect from heat and moisture. Do not use this medicine if it has a strong vinegar smell. Throw away any unused medicine after the expiration date.  NOTE: This sheet is a summary. It may not cover all possible information. If you have questions about this medicine, talk to your doctor, pharmacist, or health care provider.  © 2014, [employer]. (3/10/2009 10:44:17 AM)      · Is  patient discharged on Warfarin / Coumadin?   No     · Is patient Post Blood Transfusion?  No    Depression / Suicide Risk    As you are discharged from this [facility] facility, it is important to learn how to keep safe from harming yourself.    Recognize the warning signs:  · Abrupt changes in personality, positive or negative- including increase in energy   · Giving away possessions  · Change in eating patterns- significant weight changes-  positive or negative  · Change in sleeping patterns- unable to sleep or sleeping all the time   · Unwillingness or inability to communicate  · Depression  · Unusual sadness, discouragement and loneliness  · Talk of wanting to die  · Neglect of personal appearance   · Rebelliousness- reckless behavior  · Withdrawal from people/activities they love  · Confusion- inability to concentrate     If you or a loved one observes any of these behaviors or has concerns about self-harm, here's what you can do:  · Talk about it- your feelings and reasons for harming yourself  · Remove any means that you might use to hurt yourself (examples: pills, rope, extension cords, firearm)  · Get professional help from the community (Mental Health, Substance Abuse, psychological counseling)  · Do not be alone:Call your Safe Contact- someone whom you trust who will be there for you.  · Call your local CRISIS HOTLINE 044-6281 or 544-769-8937  · Call your local Children's Mobile Crisis Response Team [place] (822) 151-6284 or www.[employer]in  · Call the toll free National Suicide Prevention Hotlines   · National Suicide Prevention Lifeline 048-421-QYAU (8468)  · National Hope Line Network 800-SUICIDE (885-3023)      Other diet: 1800 Calorie Diet for Diabetes Meal Planning  The 1800 calorie diet is designed for eating up to 1800 calories each day. Following this diet and making healthy meal choices can help improve overall health. This diet controls blood sugar (glucose) levels and can also  help lower blood pressure and cholesterol.  SERVING SIZES  Measuring foods and serving sizes helps to make sure you are getting the right amount of food. The list below tells how big or small some common serving sizes are:  · 1 oz.........4 stacked dice.   · 3 oz.........Deck of cards.   · 1 tsp........Tip of little finger.   · 1 tbs........Thumb.   · 2 tbs........Golf ball.   · ½ cup.......Half of a fist.   · 1 cup........A fist.   GUIDELINES FOR CHOOSING FOODS  The goal of this diet is to eat a variety of foods and limit calories to 1800 each day. This can be done by choosing foods that are low in calories and fat. The diet also suggests eating small amounts of food frequently. Doing this helps control your blood glucose levels so they do not get too high or too low. Each meal or snack may include a protein food source to help you feel more satisfied and to stabilize your blood glucose. Try to eat about the same amount of food around the same time each day. This includes weekend days, travel days, and days off work. Space your meals about 4 to 5 hours apart and add a snack between them if you wish.   For example, a daily food plan could include breakfast, a morning snack, lunch, dinner, and an evening snack. Healthy meals and snacks include whole grains, vegetables, fruits, lean meats, poultry, fish, and dairy products. As you plan your meals, select a variety of foods. Choose from the bread and starch, vegetable, fruit, dairy, and meat/protein groups. Examples of foods from each group and their suggested serving sizes are listed below. Use measuring cups and spoons to become familiar with what a healthy portion looks like.  Bread and Starch  Each serving equals 15 grams of carbohydrates.  2. 1 slice bread.   3. ¼ bagel.   4. ¾ cup cold cereal (unsweetened).   5. ½ cup hot cereal or mashed potatoes.   6. 1 small potato (size of a computer mouse).   7.  cup cooked pasta or rice.   8. ½ English muffin.   9. 1 cup  broth-based soup.   10. 3 cups of popcorn.   11. 4 to 6 whole-wheat crackers.   12. ½ cup cooked beans, peas, or corn.   Vegetable  Each serving equals 5 grams of carbohydrates.  2. ½ cup cooked vegetables.   3. 1 cup raw vegetables.   4. ½ cup tomato or vegetable juice.   Fruit  Each serving equals 15 grams of carbohydrates.  2. 1 small apple or orange.   3. 1¼ cup watermelon or strawberries.   4. ½ cup applesauce (no sugar added).   5. 2 tbs raisins.   6. ½ banana.   7. ½ cup canned fruit, packed in water, its own juice, or sweetened with a sugar substitute.   8. ½ cup unsweetened fruit juice.   Dairy  Each serving equals 12 to 15 grams of carbohydrates.  2. 1 cup fat-free milk.   3. 6 oz artificially sweetened yogurt or plain yogurt.   4. 1 cup low-fat buttermilk.   5. 1 cup soy milk.   6. 1 cup almond milk.   Meat/Protein  · 1 large egg.   · 2 to 3 oz meat, poultry, or fish.   · ¼ cup low-fat cottage cheese.   · 1 tbs peanut butter.   · 1 oz low-fat cheese.   · ¼ cup tuna in water.   · ½ cup tofu.   Fat  · 1 tsp oil.   · 1 tsp trans-fat-free margarine.   · 1 tsp butter.   · 1 tsp mayonnaise.   · 2 tbs avocado.   · 1 tbs salad dressing.   · 1 tbs cream cheese.   · 2 tbs sour cream.   SAMPLE 1800 CALORIE DIET PLAN  Breakfast  · ¾ cup unsweetened cereal (1 carb serving).   · 1 cup fat-free milk (1 carb serving).   · 1 slice whole-wheat toast (1 carb serving).   · ½ small banana (1 carb serving).   · 1 scrambled egg.   · 1 tsp trans-fat-free margarine.   Lunch  · Tuna sandwich.   · 2 slices whole-wheat bread (2 carb servings).   · ½ cup canned tuna in water, drained.   · 1 tbs reduced fat mayonnaise.   · 1 stalk celery, chopped.   · 2 slices tomato.   · 1 lettuce leaf.   · 1 cup carrot sticks.   · 24 to 30 seedless grapes (2 carb servings).   · 6 oz light yogurt (1 carb serving).   Afternoon Snack  · 3 rell cracker squares (1 carb serving).   · Fat-free milk, 1 cup (1 carb serving).   · 1 tbs peanut butter.    Dinner  · 3 oz salmon, broiled with 1 tsp oil.   · 1 cup mashed potatoes (2 carb servings) with 1 tsp trans-fat-free margarine.   · 1 cup fresh or frozen green beans.   · 1 cup steamed asparagus.   · 1 cup fat-free milk (1 carb serving).   Evening Snack  · 3 cups air-popped popcorn (1 carb serving).   · 2 tbs parmesan cheese sprinkled on top.   MEAL PLAN  Use this worksheet to help you make a daily meal plan based on the 1800 calorie diet suggestions. If you are using this plan to help you control your blood glucose, you may interchange carbohydrate-containing foods (dairy, starches, and fruits). Select a variety of fresh foods of varying colors and flavors. The total amount of carbohydrate in your meals or snacks is more important than making sure you include all of the food groups every time you eat. Choose from the following foods to build your day's meals:  · 8 Starches.   · 4 Vegetables.   · 3 Fruits.   · 2 Dairy.   · 6 to 7 oz Meat/Protein.   · Up to 4 Fats.   Your dietician can use this worksheet to help you decide how many servings and which types of foods are right for you.  BREAKFAST  Food Group and Servings / Food Choice  Starch ________________________________________________________  Dairy _________________________________________________________  Fruit _________________________________________________________  Meat/Protein __________________________________________________  Fat ___________________________________________________________  LUNCH  Food Group and Servings / Food Choice  Starch ________________________________________________________  Meat/Protein __________________________________________________  Vegetable _____________________________________________________  Fruit _________________________________________________________  Dairy _________________________________________________________  Fat ___________________________________________________________  AFTERNOON SNACK  Food Group and  Servings / Food Choice  Starch ________________________________________________________  Meat/Protein __________________________________________________  Fruit __________________________________________________________  Dairy _________________________________________________________  DINNER  Food Group and Servings / Food Choice  Starch _________________________________________________________  Meat/Protein ___________________________________________________  Dairy __________________________________________________________  Vegetable ______________________________________________________  Fruit ___________________________________________________________  Fat ____________________________________________________________  EVENING SNACK  Food Group and Servings / Food Choice  Fruit __________________________________________________________  Meat/Protein ___________________________________________________  Dairy __________________________________________________________  Starch _________________________________________________________  DAILY TOTALS  Starch ____________________________  Vegetable _________________________  Fruit _____________________________  Dairy _____________________________  Meat/Protein______________________  Fat _______________________________  Document Released: 07/10/2006 Document Revised: 03/11/2013 Document Reviewed: 11/02/2012  ExitCare® Patient Information ©2013 [employer].       Total Hip Replacement  Total hip replacement is a surgery to replace your damaged hip joint. Your hip joint is replaced with a man-made (artificial) hip joint. This man-made hip joint is called a prosthesis. This surgery is done to lessen pain and improve movement.   BEFORE THE PROCEDURE   · Do not eat or drink anything after midnight on the night before the procedure or as told by your doctor.  · Ask your doctor about:  1. Changing or stopping your normal medicines. This is important if you take diabetes medicines  or blood thinners.  2. Taking aspirin or ibuprofen medicines. These thin your blood. Do not take these medicines if your doctor tells you not to.  · Plan to have someone take you home after the procedure.  PROCEDURE   13. An IV tube will be put into one of your veins.  14. You will be given one or more of the followin. A medicine that makes you relaxed (sedative).  2. A medicine that makes you fall asleep (general anesthetic).  3. A medicine that numbs your body below the waist (spinal anesthetic).  15. A cut (incision) will be made in your hip. Your surgeon will take out any damaged parts of your hip joint.  16. Your surgeon will then:  1. Put a man-made hip joint into your pelvic bone. Screws may be used to keep the hip joint in place.  2. Take out the damaged ball of your thigh bone (femur). A man-made ball on a metal pole will replace the damaged ball.  3. The ball will be put into the new socket to make a new hip joint. Your hip joint will be checked to see if it moves as it should.  4. Close the cut and place a bandage over it.  AFTER THE PROCEDURE   5. You will stay in a recovery area until your medicines wear off.  6. Your nurse will monitor your vital signs, such as your pulse and blood pressure.  7. Once you are doing okay, you will be taken to your hospital room.  8. You may have to wear socks that prevent blood clots and lessen swelling. These are called compression stockings.  9. You will do exercise therapy (physical therapy) until you are doing well. Your doctor will tell you when you are well enough to go home.     This information is not intended to replace advice given to you by your health care provider. Make sure you discuss any questions you have with your health care provider.     Document Released: 2013 Document Revised: 2016 Document Reviewed: 2015  [employer] Interactive Patient Education © [employer]    Total Hip Replacement, Care After  These instructions give  you information on caring for yourself after your procedure. Your doctor also may give you specific instructions. Call your doctor if you have any problems or questions after your procedure.  HOME CARE   Your doctor will give you instructions on what types of movements are okay and not okay.   · Take medicines as told by your doctor.  · Take quick showers (3-5 minutes). Avoid baths until your doctor says it is okay.  · Avoid lifting until your doctor says it is okay.  · Use a raised toilet as told by your doctor.  · Avoid sitting in low chairs as told by your doctor.  · Use crutches or a walker as told by your doctor.  GET HELP IF:  17. You have trouble breathing.  18. You have fluid coming from your surgery site.  19. You have redness or swelling at your surgery site.  20. You have a bad smell coming from your surgery site.  21. You have bleeding that will not stop.  22. Your surgical cut opens up after sutures (stitches) or staples are removed.  23. You have a fever.  GET HELP RIGHT AWAY IF:  10. You have a rash.  11. You have pain or puffiness on your thigh or on the back of your lower leg.  12. You have shortness of breath or chest pain.  MAKE SURE YOU:  9. Understand these instructions.  10. Will watch your condition.  11. Will get help right away if you are not doing well or get worse.     This information is not intended to replace advice given to you by your health care provider. Make sure you discuss any questions you have with your health care provider.     Document Released: 03/11/2013 Document Revised: 01/08/2016 Document Reviewed: 02/18/2015  Elsevier Interactive Patient Education ©2016 "[employer]    Hip Rehabilitation After Surgery  Exercising your hip can greatly improve the results of your hip surgery. The exercises described here are designed to help you keep full movement of your hip joint.  HOW SHOULD I EXERCISE MY HIP?  The following exercises can be done on a training mat, on the floor, on a  table, or on a bed. Use whatever works best and is most comfortable for you. Perform all exercises about fifteen times on each side, three times per day or as directed.  · Lying on your back, slowly slide your foot toward your buttocks, raising your knee up off the floor. Then slowly slide your foot back down until your leg is straight again.  · Lying on your back, spread your legs as far apart as you can without feeling discomfort.  · Lying on your side, raise your leg straight up from the floor as far as is comfortable. Slowly lower the leg.  · Lying on your back, tighten up the muscle in the front of your thigh (quadriceps). You can do this by keeping your leg straight and trying to raise your heel off the floor. This helps strengthen the largest muscle supporting your knee.  · Lying on your back, tighten up the muscles of your buttocks both with the legs straight and with the knee bent at a comfortable angle while keeping your heel on the floor.  · Lying on your stomach, lift your toes off the floor toward your buttocks. Bend your knee as far as is comfortable. Tighten the muscles in your buttocks while doing this.  Follow all safety measures that are given to protect your hip. If any of these exercises cause increased pain or swelling in your joint, decrease the exercises until you are comfortable again. Then slowly increase the exercises. Call your health care provider if you have problems or questions.      This information is not intended to replace advice given to you by your health care provider. Make sure you discuss any questions you have with your health care provider.     Document Released: 07/21/2005 Document Revised: 01/08/2016 Document Reviewed: 03/19/2015  Elsevier Interactive Patient Education ©2016 [employer].    Pain Medicine Instructions  HOW CAN PAIN MEDICINE AFFECT ME?  You were prescribed pain medicine. This medicine may:  · Make you tired or sleepy.  · Affect how well you  can:  1. Drive  2. Do certain activities.  Pain medicine may not make all of your pain go away. You should be comfortable enough to:  24. Move.  25. Breathe.  26. Take care of yourself.  HOW OFTEN SHOULD I TAKE PAIN MEDICINE AND HOW MUCH SHOULD I TAKE?  13. Take pain medicine only as told by your doctor and only as needed for pain.  14. You do not need to take pain medicine if you are not having pain, unless your doctor tells you to do that.  15. You can take less than the prescribed dose if you find that less medicine helps your pain.  WHAT SHOULD I AVOID WHILE I AM TAKING PAIN MEDICINE?  Follow these instructions after you start taking pain medicine, while you are taking the medicine, and for 8 hours after you stop taking the medicine:  12. Do not drive.  13. Do not use machinery.  14. Do not use power tools.  15. Do not sign legal documents.  16. Do not drink alcohol.  17. Do not take sleeping pills.  18. Do not take care of children by yourself.  19. Do not do any activities that involve climbing or being in high places.  20. Do not go into any body of water unless there is an adult nearby who can watch and help you. This includes:  1. Lakes.  2. Rivers.  3. Oceans.  4. Spas.  5. Swimming pools.  HOW CAN I KEEP OTHERS SAFE WHILE I AM TAKING PAIN MEDICINE?  7. Store your pain medicine as told by your doctor. Make sure that you keep it where children and pets cannot reach it.  8. Do not share your pain medicine with anyone.  9. Do not save any leftover pills. If you have any leftover pain medicine, get rid of it or destroy it as told by your doctor.  WHAT ELSE DO I NEED TO KNOW ABOUT TAKING PAIN MEDICINE?  · Use a poop (stool) softener if you have trouble pooping (constipation) because of your pain medicine. Eating more fruits and vegetables also helps with constipation.  · Write down the times when you take your pain medicine. Look at the times before you take your next dose of medicine.  · If your pain is very  bad, do not take more pills than told by your doctor. Call your doctor for help.  · Your pain medicine might have acetaminophen in it. Do not take any other acetaminophen while you are taking this medicine. An overdose of acetaminophen can do very bad damage to your liver. If you are taking any medicines in addition to your pain medicine, check the active ingredients on those medicines to see if acetaminophen is listed.  WHEN SHOULD I CALL MY DOCTOR?  · Your medicine is not helping the pain.  · You do either of these soon after you take the medicine:  1. Throw up (vomit).  2. Have watery poop (diarrhea).  · You have new pain in areas that did not hurt before.  · You have an allergic reaction to your medicine. This may include:  1. Feeling itchy.  2. Swelling.  3. Feeling dizzy.  4. Getting a new rash.  WHEN SHOULD I CALL 911 OR GO TO THE EMERGENCY ROOM?  · You feel dizzy or you faint.  · You feel very confused.  · You throw up again and again.  · Your skin or lips turn pale or bluish in color.  · You are:  1. Short of breath.  2. Breathing much more slowly than usual.  · You have a very bad allergic reaction to your medicine. This includes:  1. Developing a swollen tongue.  2. Having trouble breathing.     This information is not intended to replace advice given to you by your health care provider. Make sure you discuss any questions you have with your health care provider.     Document Released: 06/05/2009 Document Revised: 05/03/2016 Document Reviewed: 10/22/2015  [employer] Interactive Patient Education ©2016 [employer]    Infection Control in the Home  If you have an infection or you are taking care of someone who has an infection, it is important to know how to keep the infection from spreading. Follow these guidelines to help stop the spread of infection, and talk to your health care provider.  HOW ARE INFECTIONS SPREAD?  In order for an infection to spread, the following must be present:  · A germ. This may  be a virus, bacteria, fungus, or parasite.  · A place for the germ to live. This may be:  1. On or in a person, animal, plant, or food.  2. In soil or water.  3. On surfaces, such as a door handle.  · A susceptible host. This is a person or animal who does not have resistance (immunity) to the germ.  · A way for the germ to enter the host. This may occur by:  1. Direct contact. This may happen by making contact--such as shaking hands or hugging--with an infected person or animal. Some germs can also travel through the air and spread to you if an infected person coughs or sneezes on you or near you.  2. Indirect contact. This is when the germ enters the host through contact with an infected object. Examples include eating contaminated food, drinking contaminated water, or touching a contaminated surface with your hands and then touching your face, nose, or mouth soon after that.  HOW CAN I HELP TO PREVENT INFECTION FROM SPREADING?  There are several things that you can do to help prevent infection from spreading.  Hand Washing  It is very important to wash your hands correctly, following these steps:  27. Wet your hands with clean, running water.  28. Apply soap to your hands. Liquid soap is better than bar soap.  29. Rub your hands together quickly to create lather.  30. Keep rubbing your hands together for at least 20 seconds. Thoroughly scrub all parts of your hands, including under your fingernails and between your fingers.  31. Rinse your hands with clean, running water until all of the soap is gone.  32. Dry your hands with an air dryer or a clean paper or cloth towel, or let your hands air-dry. Do not use your clothing or a soiled towel to dry your hands.  33. If you are in a public restroom, use your towel to turn off the water faucet and to open the bathroom door.  Make sure to wash your hands:  16. Before:  1. Visiting a baby or anyone with a weakened or lowered defense (immune) system.  2. Putting in and  taking out any contact lenses.  17. After:  1. Working or playing outside.  2. Touching an animal or its toys or leash.  3. Handling livestock.  4. Using the bathroom or helping a child or adult to use the bathroom.  5. Using household  or toxic chemicals.  6. Touching or taking out the garbage.  7. Touching anything dirty around your home.  8. Handling soiled clothes or rags.  9. Taking care of a sick child. This includes touching used tissues, toys, and clothes.  10. Sneezing, coughing, or blowing your nose.  11. Using public transportation.  12. Shaking hands.  13. Using a phone, including your mobile phone.  14. Touching money.  18. Before and after:  1. Preparing food.  2. Preparing a bottle for a baby.  3. Feeding a baby or a young child.  4. Eating.  5. Visiting or taking care of someone who is sick.  6. Changing a diaper.  7. Changing a bandage (dressing) or taking care of an injury or wound.  8. Giving or taking medicine.  Taking Care of Your Home  21. Make sure that you have enough cleaning supplies at all times. These include:  1. Disinfectants.  2. Reusable cleaning cloths. Wash these after each use.  3. Paper towels.  4. Utility gloves. Replace your gloves if they are cracked or torn or if they start to peel.  22. Use bleach safely. Never mix it with other cleaning products, especially those that contain ammonia. This mixture can create a dangerous gas that may be deadly.  23. Take care of your cleaning supplies. Toilet brushes, mops, and sponges can breed germs. Soak them in bleach and water for 5 minutes after each use.  24. Do not pour used mop water down the sink. Pour it down the toilet instead.  25. Maintain proper ventilation in your home.  26. If you have a pet, ensure that your pet stays clean. Do not let people with weak immune systems touch bird droppings, fish tank water, or a litter box.  1. If you have a cat, be sure to change the litter every day.  27. In the bathroom, make sure  you:  1. Provide liquid soap.  2. Change towels and washcloths frequently. Avoid sharing towels and washcloths.  3. Change toothbrushes often and store them separately in a clean, dry place.  4. Disinfect the toilet.  5. Clean the tub, shower, and sink with standard cleaning products.    6. Mop the floor with a standard .  7. Do not share personal items, such as razors, toothbrushes, drinking glasses, deodorant, gregorio, brushes, towels, and washcloths.    28. In the kitchen, make sure you:  1. Store food carefully.  2. Refrigerate leftovers promptly in covered containers.  3. Throw out stale or spoiled food.  4. Clean the inside of your refrigerator each week.  5. Keep your refrigerator set at 40°F (4°C) or less, and set your freezer at 0°F (-18°C) or less.  6. Thaw foods in the refrigerator or microwave, not at room temperature.  7. Serve foods at the proper temperature. Do not eat raw meat. Make sure it is cooked to the appropriate temperature. Cook eggs until they are firm.  8. Wash fruits and vegetables under running water.  9. Use separate cutting boards, plates, and utensils for raw foods and cooked foods.  10. Keep work surfaces clean.  11. Use a clean spoon each time you sample food while cooking.  12. Wash your dishes in hot, soapy water. Air-dry your dishes or use a .  13. Do not share forks, cups, or spoons during meals.  29. Wear gloves if laundry is visibly soiled.  30. Change linens each week or whenever they are soiled.  31. Do not shake soiled linens. Doing that may send germs into the air. Put dressings, sanitary or incontinence pads, diapers, and gloves in plastic garbage bags for disposal.     This information is not intended to replace advice given to you by your health care provider. Make sure you discuss any questions you have with your health care provider.     Document Released: 09/26/2009 Document Revised: 01/08/2016 Document Reviewed: 08/20/2015  Elsevier Interactive Patient  Education ©2016 [employer]          Follow-up Information     1. Follow up with Jason Zepeda M.D.. Schedule an appointment as soon as possible for a visit in 2 weeks.    Specialty:  Orthopaedics    Why:  As needed, If symptoms worsen, For suture removal, For wound re-check    Contact information    [address]  [address]  [place] [facility] [address]  [phone]           Discharge Medication Instructions:    Below are the medications your physician expects you to take upon discharge:    Review all your home medications and newly ordered medications with your doctor and/or pharmacist. Follow medication instructions as directed by your doctor and/or pharmacist.    Please keep your medication list with you and share with your physician.               Medication List      CHANGE how you take these medications        Instructions    Morning Afternoon Evening Bedtime    * aspirin EC 81 MG Tbec   What changed:  Another medication with the same name was added. Make sure you understand how and when to take each.   Last time this was given:  81 mg on 7/6/2017 11:19 PM   Commonly known as:  ECOTRIN        Take 81 mg by mouth every morning.   Dose:  81 mg                        * aspirin 81 MG EC tablet   What changed:  You were already taking a medication with the same name, and this prescription was added. Make sure you understand how and when to take each.   Last time this was given:  81 mg on 7/6/2017 11:19 PM        Take 1 Tab by mouth 2 times a day.   Dose:  81 mg                        * tramadol 50 MG Tabs   What changed:  Another medication with the same name was added. Make sure you understand how and when to take each.   Last time this was given:  50 mg on 7/7/2017  2:54 AM   Commonly known as:  ULTRAM        Take 1 Tab by mouth 2 times a day as needed for Severe Pain.   Dose:  50 mg                        * tramadol 50 MG Tabs   What changed:  You were already taking a medication with the same name, and this  prescription was added. Make sure you understand how and when to take each.   Last time this was given:  50 mg on 7/7/2017  2:54 AM   Commonly known as:  ULTRAM        Take 1 Tab by mouth every four hours as needed (Moderate Pain (NRS Pain Scale 4-6; CPOT Pain Scale 3-5) if opiates not ordered or tolerated).   Dose:  50 mg                        * Notice:  This list has 4 medication(s) that are the same as other medications prescribed for you. Read the directions carefully, and ask your doctor or other care provider to review them with you.      ASK your doctor about these medications        Instructions    Morning Afternoon Evening Bedtime    acetaminophen 500 MG Tabs   Last time this was given:  1,000 mg on 7/7/2017  5:33 AM   Commonly known as:  TYLENOL        Take 500 mg by mouth Pre-Op Once.   Dose:  500 mg                        atorvastatin 20 MG Tabs   Last time this was given:  20 mg on 7/6/2017  8:25 PM   Commonly known as:  LIPITOR        Take 1 Tab by mouth every bedtime.   Dose:  20 mg                        Biotin 5000 MCG Caps        Take 1 Capsule by mouth every bedtime.   Dose:  1 Capsule                        celecoxib 200 MG Caps   Commonly known as:  CELEBREX        Take 200 mg by mouth Pre-Op Once.   Dose:  200 mg                        ciprofloxacin 500 MG Tabs   Commonly known as:  CIPRO        Take 500 mg by mouth 2 times a day. 10 day course started 6/26/17   Dose:  500 mg                        diclofenac EC 75 MG Tbec   Commonly known as:  VOLTAREN        Take 75 mg by mouth 2 times a day.   Dose:  75 mg                        gabapentin 600 MG tablet   Commonly known as:  NEURONTIN        Take 600 mg by mouth Pre-Op Once.   Dose:  600 mg                        glipiZIDE 5 MG Tabs   Commonly known as:  GLUCOTROL        Take 5 mg by mouth 2 times a day as needed (takes if blood sugar is greater 120).   Dose:  5 mg                        Krill Oil 500 MG Caps        Take 1 Cap by mouth  every bedtime.   Dose:  1 Cap                        levothyroxine 50 MCG Tabs   Last time this was given:  50 mcg on 7/7/2017  6:20 AM   Commonly known as:  SYNTHROID        TAKE ONE TABLET BY MOUTH ONCE DAILY IN THE MORNING ON EMPTY STOMACH                        lisinopril 10 MG Tabs   Commonly known as:  PRINIVIL        Take 10 mg by mouth 1 time daily as needed (Pt only takes if blood pressure is 135/85 or higher.).   Dose:  10 mg                        metformin 500 MG Tabs   Last time this was given:  500 mg on 7/7/2017  5:33 AM   Commonly known as:  GLUCOPHAGE        Take 1 Tab by mouth 2 times a day, with meals.   Dose:  500 mg                        MULTIVITAMIN ADULT PO        Take 1 tablet by mouth every bedtime.   Dose:  1 tablet                        Turmeric 500 MG Tabs        Take 1 tablet by mouth every bedtime.   Dose:  1 tablet                             Where to Get Your Medications      Information about where to get these medications is not yet available     ! Ask your nurse or doctor about these medications    - aspirin 81 MG EC tablet  - tramadol 50 MG Tabs            Instructions           Diet / Nutrition:    Follow any diet instructions given to you by your doctor or the dietician, including how much salt (sodium) you are allowed each day.    If you are overweight, talk to your doctor about a weight reduction plan.    Activity:    Remain physically active following your doctor's instructions about exercise and activity.    Rest often.     Any time you become even a little tired or short of breath, SIT DOWN and rest.    Worsening Symptoms:    Report any of the following signs and symptoms to the doctor's office immediately:    *Pain of jaw, arm, or neck  *Chest pain not relieved by medication                               *Dizziness or loss of consciousness  *Difficulty breathing even when at rest   *More tired than usual                                       *Bleeding drainage or  swelling of surgical site  *Swelling of feet, ankles, legs or stomach                 *Fever (>100ºF)  *Pink or blood tinged sputum  *Weight gain (3lbs/day or 5lbs /week)           *Shock from internal defibrillator (if applicable)  *Palpitations or irregular heartbeats                *Cool and/or numb extremities    Stroke Awareness    Common Risk Factors for Stroke include:    Age  Atrial Fibrillation  Carotid Artery Stenosis  Diabetes Mellitus  Excessive alcohol consumption  High blood pressure  Overweight   Physical inactivity  Smoking    Warning signs and symptoms of a stroke include:    *Sudden numbness or weakness of the face, arm or leg (especially on one side of the body).  *Sudden confusion, trouble speaking or understanding.  *Sudden trouble seeing in one or both eyes.  *Sudden trouble walking, dizziness, loss of balance or coordination.Sudden severe headache with no known cause.    It is very important to get treatment quickly when a stroke occurs. If you experience any of the above warning signs, call 911 immediately.                   Disclaimer         Quit Smoking / Tobacco Use:    I understand the use of any tobacco products increases my chance of suffering from future heart disease or stroke and could cause other illnesses which may shorten my life. Quitting the use of tobacco products is the single most important thing I can do to improve my health. For further information on smoking / tobacco cessation call a Toll Free Quit Line at 1-787.834.1857 (*[employer] [place]) or 1-244.596.5967 (American Lung Association) or you can access the web based program at www.lungusa.org.    [place] Tobacco Users Help Line:  (433) 296-5814       Toll Free: 1-786.540.1714  Quit Tobacco Program [facility] [phone]    Crisis Hotline:    [place] Crisis Hotline:  4-368-GOADUHF or 1-441.728.7022    [place] Crisis Hotline:    1-578.663.8320 or 316-851-6326    Discharge Survey:   Thank  you for choosing [facility]. We hope we did everything we could to make your hospital stay a pleasant one. You may be receiving a phone survey and we would appreciate your time and participation in answering the questions. Your input is very valuable to us in our efforts to improve our service to our patients and their families.        My signature on this form indicates that:    1. I have reviewed and understand the above information.  2. My questions regarding this information have been answered to my satisfaction.  3. I have formulated a plan with my discharge nurse to obtain my prescribed medications for home.                  Disclaimer         __________________________________                     __________       ________                       Patient Signature                                                 Date                    Time

## 2017-07-06 NOTE — THERAPY
"Physical Therapy Evaluation completed.   Bed Mobility:  Supine to Sit: Stand by Assist  Transfers: Sit to Stand: Contact Guard Assist  Gait: Level Of Assist: Contact Guard Assist with Front-Wheel Walker       Plan of Care: Will benefit from Physical Therapy 4 times per week  Discharge Recommendations: Equipment: Front-Wheel Walker. Post-acute therapy Currently anticipate no further skilled therapy needs once patient is discharged from the inpatient setting.    See \"Rehab Therapy-Acute\" Patient Summary Report for complete documentation.     "

## 2017-07-06 NOTE — CARE PLAN
Problem: Discharge Barriers/Planning  Goal: Patient’s Continuum of care needs are met  Outcome: PROGRESSING AS EXPECTED  Pt is ambulating very well, and tolerating full liquids will advance once pt is more awake. She is very sleepy and tired     Problem: Risk for Impaired Mobility  Goal: Mobilization  Outcome: PROGRESSING AS EXPECTED  Pt up with one assist and FWW, tolerating it very well.

## 2017-07-06 NOTE — IP AVS SNAPSHOT
7/7/2017    Morgan Solis  [address]  [place] [address]    Dear Morgan:    [facility] wants to ensure your discharge home is safe and you or your loved ones have had all of your questions answered regarding your care after you leave the hospital.    Below is a list of resources and contact information should you have any questions regarding your hospital stay, follow-up instructions, or active medical symptoms.    Questions or Concerns Regarding… Contact   Medical Questions Related to Your Discharge  (7 days a week, 8am-5pm) Contact a Nurse Care Coordinator   [phone]   Medical Questions Not Related to Your Discharge  (24 hours a day / 7 days a week)  Contact the Nurse Health Line   [phone]    Medications or Discharge Instructions Refer to your discharge packet   or contact your [facility] [place] Primary Care Provider   [phone]   Follow-up Appointment(s) Schedule your appointment via [facility]   or contact Scheduling [phone]   Billing Review your statement via [facility]  or contact Billing [phone]   Medical Records Review your records via [facility]   or contact Medical Records [phone]     You may receive a telephone call within two days of discharge. This call is to make certain you understand your discharge instructions and have the opportunity to have any questions answered. You can also easily access your medical information, test results and upcoming appointments via the [facility] free online health management tool. You can learn more and sign up at [facility]. For assistance setting up your [facility] account, please call [phone].    Once again, we want to ensure your discharge home is safe and that you have a clear understanding of any next steps in your care. If you have any questions or concerns, please do not hesitate to contact us, we are here for you. Thank you for choosing [facility] for your healthcare needs.    Sincerely,    Your [facility] Healthcare Team

## 2017-07-06 NOTE — OR SURGEON
Immediate Post-Operative Note      PreOp Diagnosis: left hip arthritis    PostOp Diagnosis: same    Procedure(s):  HIP ARTHROPLASTY ANTERIOR TOTAL - Wound Class: Clean    Surgeon(s):  Jason Zepeda M.D.    Anesthesiologist/Type of Anesthesia:  Anesthesiologist: Narinder Jarivs M.D./General    Surgical Staff:  Circulator: Flora Frye R.N.; Eddi Garcia RYARA  Scrub Person: Ro Casas  Radiology Technologist: Saqib PALOMARES Gross    Specimen: none    Estimated Blood Loss: 100 mL    Findings: see operative note    Complications: none        7/6/2017 10:58 AM Asim Garcia

## 2017-07-07 VITALS
OXYGEN SATURATION: 92 % | SYSTOLIC BLOOD PRESSURE: 95 MMHG | TEMPERATURE: 97.8 F | HEART RATE: 98 BPM | BODY MASS INDEX: 31.99 KG/M2 | DIASTOLIC BLOOD PRESSURE: 65 MMHG | WEIGHT: 199.08 LBS | RESPIRATION RATE: 18 BRPM | HEIGHT: 66 IN

## 2017-07-07 LAB
GLUCOSE BLD-MCNC: 152 MG/DL (ref 65–99)
HCT VFR BLD AUTO: 31.5 % (ref 37–47)
HGB BLD-MCNC: 10.6 G/DL (ref 12–16)

## 2017-07-07 PROCEDURE — G8988 SELF CARE GOAL STATUS: HCPCS | Mod: CI

## 2017-07-07 PROCEDURE — 700111 HCHG RX REV CODE 636 W/ 250 OVERRIDE (IP): Performed by: ORTHOPAEDIC SURGERY

## 2017-07-07 PROCEDURE — 97116 GAIT TRAINING THERAPY: CPT

## 2017-07-07 PROCEDURE — G8987 SELF CARE CURRENT STATUS: HCPCS | Mod: CI

## 2017-07-07 PROCEDURE — 82962 GLUCOSE BLOOD TEST: CPT

## 2017-07-07 PROCEDURE — G8989 SELF CARE D/C STATUS: HCPCS | Mod: CI

## 2017-07-07 PROCEDURE — 700102 HCHG RX REV CODE 250 W/ 637 OVERRIDE(OP): Performed by: ORTHOPAEDIC SURGERY

## 2017-07-07 PROCEDURE — 97535 SELF CARE MNGMENT TRAINING: CPT

## 2017-07-07 PROCEDURE — 85018 HEMOGLOBIN: CPT

## 2017-07-07 PROCEDURE — 700112 HCHG RX REV CODE 229: Performed by: ORTHOPAEDIC SURGERY

## 2017-07-07 PROCEDURE — 36415 COLL VENOUS BLD VENIPUNCTURE: CPT

## 2017-07-07 PROCEDURE — 97165 OT EVAL LOW COMPLEX 30 MIN: CPT

## 2017-07-07 PROCEDURE — 700101 HCHG RX REV CODE 250: Performed by: ORTHOPAEDIC SURGERY

## 2017-07-07 PROCEDURE — 85014 HEMATOCRIT: CPT

## 2017-07-07 PROCEDURE — A9270 NON-COVERED ITEM OR SERVICE: HCPCS | Performed by: ORTHOPAEDIC SURGERY

## 2017-07-07 RX ORDER — ASPIRIN 81 MG/1
81 TABLET ORAL 2 TIMES DAILY
Qty: 90 TAB | Refills: 0 | Status: SHIPPED | OUTPATIENT
Start: 2017-07-07 | End: 2019-06-04

## 2017-07-07 RX ORDER — TRAMADOL HYDROCHLORIDE 50 MG/1
50 TABLET ORAL EVERY 4 HOURS PRN
Qty: 50 TAB | Refills: 0 | Status: SHIPPED | OUTPATIENT
Start: 2017-07-07 | End: 2017-09-08 | Stop reason: SDUPTHER

## 2017-07-07 RX ADMIN — DICLOFENAC SODIUM 75 MG: 75 TABLET, DELAYED RELEASE ORAL at 08:41

## 2017-07-07 RX ADMIN — INSULIN LISPRO 2 UNITS: 100 INJECTION, SOLUTION INTRAVENOUS; SUBCUTANEOUS at 05:43

## 2017-07-07 RX ADMIN — METFORMIN HYDROCHLORIDE 500 MG: 500 TABLET, FILM COATED ORAL at 05:33

## 2017-07-07 RX ADMIN — TRAMADOL HYDROCHLORIDE 50 MG: 50 TABLET, COATED ORAL at 02:54

## 2017-07-07 RX ADMIN — DOCUSATE SODIUM 100 MG: 100 CAPSULE ORAL at 08:41

## 2017-07-07 RX ADMIN — LEVOTHYROXINE SODIUM 50 MCG: 25 TABLET ORAL at 06:20

## 2017-07-07 RX ADMIN — ACETAMINOPHEN 1000 MG: 500 TABLET ORAL at 05:33

## 2017-07-07 RX ADMIN — DEXAMETHASONE SODIUM PHOSPHATE 4 MG: 4 INJECTION, SOLUTION INTRAMUSCULAR; INTRAVENOUS at 05:28

## 2017-07-07 RX ADMIN — TRAMADOL HYDROCHLORIDE 50 MG: 50 TABLET, COATED ORAL at 08:41

## 2017-07-07 RX ADMIN — CLINDAMYCIN IN 5 PERCENT DEXTROSE 600 MG: 12 INJECTION, SOLUTION INTRAVENOUS at 06:00

## 2017-07-07 ASSESSMENT — COGNITIVE AND FUNCTIONAL STATUS - GENERAL
SUGGESTED CMS G CODE MODIFIER DAILY ACTIVITY: CI
MOBILITY SCORE: 24
DAILY ACTIVITIY SCORE: 23
SUGGESTED CMS G CODE MODIFIER MOBILITY: CH
DRESSING REGULAR LOWER BODY CLOTHING: A LITTLE

## 2017-07-07 ASSESSMENT — GAIT ASSESSMENTS
GAIT LEVEL OF ASSIST: SUPERVISED
DISTANCE (FEET): 250
DEVIATION: ANTALGIC
ASSISTIVE DEVICE: FRONT WHEEL WALKER

## 2017-07-07 ASSESSMENT — PAIN SCALES - GENERAL
PAINLEVEL_OUTOF10: 1
PAINLEVEL_OUTOF10: 1
PAINLEVEL_OUTOF10: 2
PAINLEVEL_OUTOF10: ASSUMED PAIN PRESENT
PAINLEVEL_OUTOF10: 0
PAINLEVEL_OUTOF10: 0

## 2017-07-07 ASSESSMENT — LIFESTYLE VARIABLES: EVER_SMOKED: YES

## 2017-07-07 ASSESSMENT — ACTIVITIES OF DAILY LIVING (ADL): TOILETING: INDEPENDENT

## 2017-07-07 NOTE — DISCHARGE SUMMARY
Patient was admitted for a Total Hip Arthroplasty.  Had no complications during the surgery. Did well post-operatively.         Recent Labs      07/07/17   0239   HEMOGLOBIN  10.6*   HEMATOCRIT  31.5*       Active Hospital Problems    Diagnosis   • Osteoarthritis of left hip [M16.12]       Uneventful hospital course.     Medication List      CHANGE how you take these medications       Instructions    * aspirin EC 81 MG Tbec   What changed:  Another medication with the same name was added. Make sure you understand how and when to take each.   Last time this was given:  81 mg on 7/6/2017 11:19 PM   Commonly known as:  ECOTRIN    Take 81 mg by mouth every morning.   Dose:  81 mg       * aspirin 81 MG EC tablet   What changed:  You were already taking a medication with the same name, and this prescription was added. Make sure you understand how and when to take each.   Last time this was given:  81 mg on 7/6/2017 11:19 PM    Take 1 Tab by mouth 2 times a day.   Dose:  81 mg       * tramadol 50 MG Tabs   What changed:  Another medication with the same name was added. Make sure you understand how and when to take each.   Last time this was given:  50 mg on 7/7/2017  2:54 AM   Commonly known as:  ULTRAM    Take 1 Tab by mouth 2 times a day as needed for Severe Pain.   Dose:  50 mg       * tramadol 50 MG Tabs   What changed:  You were already taking a medication with the same name, and this prescription was added. Make sure you understand how and when to take each.   Last time this was given:  50 mg on 7/7/2017  2:54 AM   Commonly known as:  ULTRAM    Take 1 Tab by mouth every four hours as needed (Moderate Pain (NRS Pain Scale 4-6; CPOT Pain Scale 3-5) if opiates not ordered or tolerated).   Dose:  50 mg       * Notice:  This list has 4 medication(s) that are the same as other medications prescribed for you. Read the directions carefully, and ask your doctor or other care provider to review them with you.      ASK your  doctor about these medications       Instructions    acetaminophen 500 MG Tabs   Last time this was given:  1,000 mg on 7/7/2017  5:33 AM   Commonly known as:  TYLENOL    Take 500 mg by mouth Pre-Op Once.   Dose:  500 mg       atorvastatin 20 MG Tabs   Last time this was given:  20 mg on 7/6/2017  8:25 PM   Commonly known as:  LIPITOR    Take 1 Tab by mouth every bedtime.   Dose:  20 mg       Biotin 5000 MCG Caps    Take 1 Capsule by mouth every bedtime.   Dose:  1 Capsule       celecoxib 200 MG Caps   Commonly known as:  CELEBREX    Take 200 mg by mouth Pre-Op Once.   Dose:  200 mg       ciprofloxacin 500 MG Tabs   Commonly known as:  CIPRO    Take 500 mg by mouth 2 times a day. 10 day course started 6/26/17   Dose:  500 mg       diclofenac EC 75 MG Tbec   Commonly known as:  VOLTAREN    Take 75 mg by mouth 2 times a day.   Dose:  75 mg       gabapentin 600 MG tablet   Commonly known as:  NEURONTIN    Take 600 mg by mouth Pre-Op Once.   Dose:  600 mg       glipiZIDE 5 MG Tabs   Commonly known as:  GLUCOTROL    Take 5 mg by mouth 2 times a day as needed (takes if blood sugar is greater 120).   Dose:  5 mg       Krill Oil 500 MG Caps    Take 1 Cap by mouth every bedtime.   Dose:  1 Cap       levothyroxine 50 MCG Tabs   Last time this was given:  50 mcg on 7/7/2017  6:20 AM   Commonly known as:  SYNTHROID    TAKE ONE TABLET BY MOUTH ONCE DAILY IN THE MORNING ON EMPTY STOMACH       lisinopril 10 MG Tabs   Commonly known as:  PRINIVIL    Take 10 mg by mouth 1 time daily as needed (Pt only takes if blood pressure is 135/85 or higher.).   Dose:  10 mg       metformin 500 MG Tabs   Last time this was given:  500 mg on 7/7/2017  5:33 AM   Commonly known as:  GLUCOPHAGE    Take 1 Tab by mouth 2 times a day, with meals.   Dose:  500 mg       MULTIVITAMIN ADULT PO    Take 1 tablet by mouth every bedtime.   Dose:  1 tablet       Turmeric 500 MG Tabs    Take 1 tablet by mouth every bedtime.   Dose:  1 tablet             Patient  will be discharged home and follow up with Dr. Zepeda clinic in 2 weeks, for which the patient already has scheduled.

## 2017-07-07 NOTE — PROGRESS NOTES
"  Received bedside shift report from night RN. Pt AOx4.  Pt reports pain is 2/10 but \"just sore.\". Does call appropriately. Bed alarm is off.  Pt is sitting up in the chair and then going to go for a walk with the CNA. PIV assessed and is patent. Pt is on RA. POC discussed as well as unit routine, comfort, and safety. Dicussed DC planning to home today. Discussed the goal for today dc planning and education on total knee precautions. Reviewed orders, notes, labs, and test results. Hourly rounding in place with RN rounding on odd hours and CNA on even hours.  "

## 2017-07-07 NOTE — PROGRESS NOTES
A&O X4.Denies pain to hip but expresses neck pain 4/10. Given tramadol and heating pad for relief. Dressing to left hip CDI. Resting comfortably in bed. Up steady with FWW to restroom and ambulated in hallway with standby to 1 person assist. Voiding adequately. Describes living with friend at home to help her after DC and having walker at home. SCDs in place, call light within reach, personal items within reach. Calls appropriately.

## 2017-07-07 NOTE — THERAPY
"Occupational Therapy Evaluation completed.   Functional Status:  Up in chair, spv w/sit>stand walking in room to bathroom, mod I w/toilet txf, LB dressing and standing at sink for grooming, no overt c/o pain or fatigue, educated on safety w/functional txfs encouraged to keep fww close vs pushing to side during activity, reports daughter is able to assist upon d/c remained up in chair w/PT present for next session   Plan of Care: Patient with no further skilled OT needs in the acute care setting at this time  Discharge Recommendations:  Equipment: No Equipment Needed. Post-acute therapy Currently anticipate no further skilled therapy needs once patient is discharged from the inpatient setting.    See \"Rehab Therapy-Acute\" Patient Summary Report for complete documentation.    "

## 2017-07-07 NOTE — THERAPY
"Pt agreeable to tx today. Pt presents w/improvements in functional mobility. Pt c/o L  distal quad discomfort. Pt educated on muscle activity and continuing therex's to work towards strengthening her L hip and knee. Pt performed therex's @PSV level, as well as functional mobility. pt able to transfer on own w/safety. Pt amb further today andn able to ascend/descend 2 steps in prep for return to home. All questions answered. Pt is cleared from Pt to ND home.    Physical Therapy Treatment completed.   Bed Mobility:  Supine to Sit: Stand by Assist  Transfers: Sit to Stand: Supervised  Gait: Level Of Assist: Supervised with Front-Wheel Walker       Plan of Care: Patient with no further skilled PT needs in the acute care setting at this time and Patient demonstrates safety with mobility in this environment at this time.   Discharge Recommendations: Equipment: Will Continue to Assess for Equipment Needs. Post-acute therapy Currently anticipate no further skilled therapy needs once patient is discharged from the inpatient setting.     See \"Rehab Therapy-Acute\" Patient Summary Report for complete documentation.       "

## 2017-07-07 NOTE — DISCHARGE INSTRUCTIONS
*Follow up with Dr. Zepeda at scheduled appointment  *Weight bearing as tolerated                      *Activity as tolerated  *Use assistive device for all activity  *Continue exercises provided by physical therapy  *Elevate leg as needed  *Ice as needed (20 minutes every 1-2 hours)  *Keep dressing in place until 07/11/2017 postoperative day #5   *Starting 7/11 remove dressing and shower. Do not soak or scrub incision, after shower pat dry and leave open to air.  *No soaking of the incision; no baths, hot tubs, or swimming until cleared by doctor  * aspirin 81 mg twice a day for blood clot prevention        *Take medications as prescribed by doctor  *Call doctor’s office with any questions or concerns     Discharge Instructions    Discharged to home by car with relative. Discharged via wheelchair, hospital escort: Yes.  Special equipment needed: Not Applicable    Be sure to schedule a follow-up appointment with your primary care doctor or any specialists as instructed.     Discharge Plan:   Diet Plan: Discussed  Activity Level: Discussed  Confirmed Follow up Appointment: Patient to Call and Schedule Appointment  Confirmed Symptoms Management: Discussed  Medication Reconciliation Updated: Yes  Influenza Vaccine Indication: Indicated: Not available from distributor/    I understand that a diet low in cholesterol, fat, and sodium is recommended for good health. Unless I have been given specific instructions below for another diet, I accept this instruction as my diet prescription.   Other diet: Diabetic Diet    Special Instructions: Discharge instructions for the Orthopedic Patient    Follow up with Primary Care Physician within 2 weeks of discharge to home, regarding:  Review of medications and diagnostic testing.  Surveillance for medical complications.  Workup and treatment of osteoporosis, if appropriate.     -Is this a Joint Replacement patient? Yes   Total Joint Hip Replacement Discharge  Instructions    Pain  - The goal is to slowly wean off the prescription pain medicine.  - Ice can be used for pain control.  20 minutes at a time is recommended, and never directly against your skin or incision.  - Most patients are off the pain pills by 3 weeks; others may require a low level of pain medications for many months. If your pain continues to be severe, follow up with your physician.  Infection  Deep hip joint infections that require removal of the prostheses occur in less than 0.1% of patients. Lesser infections in the skin (cellulites) are more common and much more easily treated.  - Keep the incision as clean and dry as possible.  - Always wash your hands before touching your incision.  - Skin infections tend to develop around 7-10 days after surgery, most can be treated with oral antibiotics.  - Dental Care should be delayed for 3 months after surgery, your surgeon recommends taking a dose of antibiotics 1 hour prior to any dental procedure.  After 2 years, most surgeons recommend antibiotics only before an extensive procedure.  Ask your surgeon what he recommends.  - Signs and symptoms of infection can include:  low grade fever, redness, pain, swelling and drainage from your incision.  Notify your surgeon immediately if you develop any of these symptoms.  Post op Disturbances  - Bowel habits - constipation is extremely common and is caused by a combination of anesthesia, lack of mobility and pain medicine.  Use stool softeners or laxatives if necessary. It is important not to ignore this problem, as bowel obstructions can be a serious complication after joint replacement surgery.  - Mood/Energy Level - Many patients experience a lack of energy and endurance for up to 2-3 months after surgery.  Some may also feel down and can even become depressed.  This is likely due to the postoperative anemia, change in activity level, lack of sleep, pain medicine and just the emotional reaction to the surgery  itself that is a big disruption in a person’s life.  This usually passes.  If symptoms persist, follow up with your primary physician.  - Returning to work - Your surgeon will give you more specific instructions.  Generally, if you work a sedentary job requiring little standing or walking, most patients may return within 2-6 weeks.  Manual labor jobs involving walking, lifting and standing may take 3-4 months.  Your surgeon’s office can provide a release to part-time or light duty work early on in your recovery and progress you to full duty as able.  - Driving - You can begin driving an automatic shift car in 4 to 8 weeks, provided you are no longer taking narcotic pain medication. If you have a stick-shift car and your right hip was replaced, do not begin driving until your doctor says you can.   - Avoiding falls -  throw rugs and tack down loose carpeting.  Be aware of floor hazards such as pets, small objects or uneven surfaces.   -  Airport Metal Detectors - The sensitivity of metal detectors varies and it is likely that your prosthesis will cause an alarm. Inform the  that you have an artificial joint.  Diet  - Resume your normal diet as tolerated.  - It is important to achieve a healthy nutritional status by eating a well balanced diet on a regular basis.  - Your physician may recommend that you take iron and vitamin supplements.   - Continue to drink plenty of fluids.  Shower/Bathing  - You may shower as soon as you get home from the hospital unless otherwise instructed.  - Keep your incision out of water.  To keep the incision dry when showering, cover it with a plastic bag or plastic wrap.  - Pat incision dry if it gets wet.  Don’t rub.  - Do not submerge in a bath until staples are out and the incision is completely healed. (Approximately 6-8 weeks after surgery).  Dressing Change:  Procedure (if recommended by your physician)  - Wash hands.  - Open all dressing change  materials.  - Remove old dressing and discard.  - Inspect incision for redness, increase in clear drainage, yellow/green drainage, odor and surrounding skin hot to touch.  -  ABD (large gauze) pad by one corner and lay over the incision.  Be careful not to touch the inside of the dressing that will lay over the incision.  - Secure in place as instructed (Ace wrap or tape).    Swelling/Bruising  - Swelling is normal after hip replacement and can involve the thigh, knee, calf and foot.  - Swelling can last from 3-6 months.  - Elevate your leg higher than your heart while reclining.  The first week you are home you should elevate your leg an equal amount of time, as you are active.    - Anti-inflammatory pills can be taken once you have stopped the blood thinners.  - The swelling is usually worse after you go home since you are upright for longer periods of time.  - Bruising is common and can involve the entire leg including the thigh, calf and even foot.  Bruising often does not appear until after you arrive home and it can be quite dramatic- purple, black, green.  The bruising you can see is not usually concerning and will subside without any treatment.      Blood Clot Prevention  Blood clots in the legs and the less common, but frightening, clots that travel to the lungs are a real focus of our preventative. Most patients are at standard risk for them, but those patients who are at higher risk include people who have had previous clots, a family history of clotting, smoking, diabetes, obesity, advanced age, use of estrogen and a sedentary lifestyle.    - Signs of blood clots in legs - Swelling in thigh, calf or ankle that does not go down with elevation.  Pain, heat and tenderness in calf, back of calf or groin area.  NOTE: blood clots can occur in either leg.  - You have been receiving anticoagulant therapy (blood thinners) in the hospital and you may be instructed to continue at home depending on your risk  factors.  - Your risk for developing a clot continues for up to 2-3 months after surgery.  You should avoid prolonged sitting and dehydration during that time (long air trips and car trips).  If you do take a trip during this time, please get up and move around every 1- 1.5 hours.  - If you are prescribed blood thinning medication for home, follow instructions as directed. (Handouts provided if applicable).      Activity    Once you get home, you should stay active. The key is not to overdo it! While you can expect some good days and some bad days, you should notice a gradual improvement over time you should notice a gradual improvement and a gradual increase in your endurance over the next 6 to 12 months.    - Weight Bearing - If you have undergone cemented or hybrid hip replacement, you can put some weight on the leg immediately using a cane or walker, and you should continue to use some support for 4 to 6 weeks to help the muscles recover.   - Sleeping Positions - Sleep on your back with your legs slightly apart or on your side with a regular pillow between your knees. Be sure to use the pillow for at least 6 weeks, or until your doctor says you can do without it. Sleeping on your stomach should be all right  - Sitting - For at least the first 3 months, sit only in chairs that have arms. Do not sit on low chairs, low stools, or reclining chairs. Do not cross your legs at the knees. The physical therapist will show you how to sit and stand from a chair, keeping your affected leg out in front of you. Get up and move around on a regular basis--at least once every hour.  - Walking - Walk as much as you like once your doctor gives you the go-ahead, but remember that walking is no substitute for your prescribed exercises. Walking with a pair of trekking poles is helpful and adds as much as 40% to the exercise you get when you walk  - Therapy may be needed in some cases, to strengthen your muscles and improve your gait  (walking pattern).  This decision will be made at your post-operative appointment.  Follow your therapist recommended post-operative exercises (handout provided by Therapist).  - Swimming is also recommended; you can begin as soon as the sutures have been removed and the wound is healed, approximately 6 to 8 weeks after surgery. Using a pair of training fins may make swimming a more enjoyable and effective exercise.  - Other activities - Lower impact activities are preferred.  If you have specific questions, consult your Surgeon.    - Sexual activity - Your surgeon can tell you when it should be safe to resume sexual activity.      When to Call the Doctor   Call the physician if:   - Fever over 100.5? F  - Increased pain, drainage, redness, odor or heat around the incision area  - Shaking chills  - Increased knee pain with activity and rest  - Increased pain in calf, tenderness or redness above or below the knee  - Increased swelling of calf, ankle, foot  - Sudden increased shortness of breath, sudden onset of chest pain, localized chest pain with coughing  - Incision opening  Or, if there are any questions or concerns about medications or care.       -Is this patient being discharged with medication to prevent blood clots?  Yes, Aspirin Aspirin, ASA oral tablets  What is this medicine?  ASPIRIN (AS pir in) is a pain reliever. It is used to treat mild pain and fever. This medicine is also used as directed by a doctor to prevent and to treat heart attacks, to prevent strokes, and to treat arthritis or inflammation.  This medicine may be used for other purposes; ask your health care provider or pharmacist if you have questions.  COMMON BRAND NAME(S): Aspir-Low, Aspir-Sherry , Aspirtab , SHIMAUMA Print System Advanced Aspirin, [place] Aspirin Extra Strength, SHIMAUMA Print System Aspirin Plus, [place] Aspirin, [place] Genuine Aspirin, [place] Womens Aspirin , Bufferin Extra Strength, Bufferin Low Dose, Bufferin  What should I tell my health care provider  before I take this medicine?  They need to know if you have any of these conditions:  -anemia  -asthma  -bleeding problems  -child with chickenpox, the flu, or other viral infection  -diabetes  -gout  -if you frequently drink alcohol containing drinks  -kidney disease  -liver disease  -low level of vitamin K  -lupus  -smoke tobacco  -stomach ulcers or other problems  -an unusual or allergic reaction to aspirin, tartrazine dye, other medicines, dyes, or preservatives  -pregnant or trying to get pregnant  -breast-feeding  How should I use this medicine?  Take this medicine by mouth with a glass of water. Follow the directions on the package or prescription label. You can take this medicine with or without food. If it upsets your stomach, take it with food. Do not take your medicine more often than directed.  Talk to your pediatrician regarding the use of this medicine in children. While this drug may be prescribed for children as young as 12 years of age for selected conditions, precautions do apply. Children and teenagers should not use this medicine to treat chicken pox or flu symptoms unless directed by a doctor.  Patients over 65 years old may have a stronger reaction and need a smaller dose.  Overdosage: If you think you have taken too much of this medicine contact a poison control center or emergency room at once.  NOTE: This medicine is only for you. Do not share this medicine with others.  What if I miss a dose?  If you are taking this medicine on a regular schedule and miss a dose, take it as soon as you can. If it is almost time for your next dose, take only that dose. Do not take double or extra doses.  What may interact with this medicine?  Do not take this medicine with any of the following medications:  -cidofovir  -ketorolac  -probenecid  This medicine may also interact with the following medications:  -alcohol  -alendronate  -bismuth subsalicylate  -flavocoxid  -herbal supplements like feverfew,  garlic, little, ginkgo biloba, horse chestnut  -medicines for diabetes or glaucoma like acetazolamide, methazolamide  -medicines for gout  -medicines that treat or prevent blood clots like enoxaparin, heparin, ticlopidine, warfarin  -other aspirin and aspirin-like medicines  -NSAIDs, medicines for pain and inflammation, like ibuprofen or naproxen  -pemetrexed  -sulfinpyrazone  -varicella live vaccine  This list may not describe all possible interactions. Give your health care provider a list of all the medicines, herbs, non-prescription drugs, or dietary supplements you use. Also tell them if you smoke, drink alcohol, or use illegal drugs. Some items may interact with your medicine.  What should I watch for while using this medicine?  If you are treating yourself for pain, tell your doctor or health care professional if the pain lasts more than 10 days, if it gets worse, or if there is a new or different kind of pain. Tell your doctor if you see redness or swelling. Also, check with your doctor if you have a fever that lasts for more than 3 days. Only take this medicine to prevent heart attacks or blood clotting if prescribed by your doctor or health care professional.  Do not take aspirin or aspirin-like medicines with this medicine. Too much aspirin can be dangerous. Always read the labels carefully.  This medicine can irritate your stomach or cause bleeding problems. Do not smoke cigarettes or drink alcohol while taking this medicine. Do not lie down for 30 minutes after taking this medicine to prevent irritation to your throat.  If you are scheduled for any medical or dental procedure, tell your healthcare provider that you are taking this medicine. You may need to stop taking this medicine before the procedure.  What side effects may I notice from receiving this medicine?  Side effects that you should report to your doctor or health care professional as soon as possible:  -allergic reactions like skin rash,  itching or hives, swelling of the face, lips, or tongue  -black, tarry stools  -bloody, coffee ground-like vomit  -breathing problems  -changes in hearing, ringing in the ears  -confusion  -general ill feeling or flu-like symptoms  -pain on swallowing  -redness, blistering, peeling or loosening of the skin, including inside the mouth or nose  -trouble passing urine or change in the amount of urine  -unusual bleeding or bruising  -unusually weak or tired  -yellowing of the eyes or skin  Side effects that usually do not require medical attention (report to your doctor or health care professional if they continue or are bothersome):  -diarrhea or constipation  -nausea, vomiting  -stomach gas, heartburn  This list may not describe all possible side effects. Call your doctor for medical advice about side effects. You may report side effects to FDA at 1-352-FDA-5625.  Where should I keep my medicine?  Keep out of the reach of children.  Store at room temperature between 15 and 30 degrees C (59 and 86 degrees F). Protect from heat and moisture. Do not use this medicine if it has a strong vinegar smell. Throw away any unused medicine after the expiration date.  NOTE: This sheet is a summary. It may not cover all possible information. If you have questions about this medicine, talk to your doctor, pharmacist, or health care provider.  © 2014, [employer]Gold [employer]. (3/10/2009 10:44:17 AM)      · Is patient discharged on Warfarin / Coumadin?   No     · Is patient Post Blood Transfusion?  No    Depression / Suicide Risk    As you are discharged from this [facility] facility, it is important to learn how to keep safe from harming yourself.    Recognize the warning signs:  · Abrupt changes in personality, positive or negative- including increase in energy   · Giving away possessions  · Change in eating patterns- significant weight changes-  positive or negative  · Change in sleeping patterns- unable to sleep or sleeping all  the time   · Unwillingness or inability to communicate  · Depression  · Unusual sadness, discouragement and loneliness  · Talk of wanting to die  · Neglect of personal appearance   · Rebelliousness- reckless behavior  · Withdrawal from people/activities they love  · Confusion- inability to concentrate     If you or a loved one observes any of these behaviors or has concerns about self-harm, here's what you can do:  · Talk about it- your feelings and reasons for harming yourself  · Remove any means that you might use to hurt yourself (examples: pills, rope, extension cords, firearm)  · Get professional help from the community (Mental Health, Substance Abuse, psychological counseling)  · Do not be alone:Call your Safe Contact- someone whom you trust who will be there for you.  · Call your local CRISIS HOTLINE 770-3040 or 615-859-8533  · Call your local Children's Mobile Crisis Response Team [place] (767) 254-5841 or www.[facility]  · Call the toll free National Suicide Prevention Hotlines   · National Suicide Prevention Lifeline 316-879-KVJK (5055)  · [place] [employer] 800-SUICIDE (450-4108)      Other diet: 1800 Calorie Diet for Diabetes Meal Planning  The 1800 calorie diet is designed for eating up to 1800 calories each day. Following this diet and making healthy meal choices can help improve overall health. This diet controls blood sugar (glucose) levels and can also help lower blood pressure and cholesterol.  SERVING SIZES  Measuring foods and serving sizes helps to make sure you are getting the right amount of food. The list below tells how big or small some common serving sizes are:  · 1 oz.........4 stacked dice.   · 3 oz.........Deck of cards.   · 1 tsp........Tip of little finger.   · 1 tbs........Thumb.   · 2 tbs........Golf ball.   · ½ cup.......Half of a fist.   · 1 cup........A fist.   GUIDELINES FOR CHOOSING FOODS  The goal of this diet is to eat a variety of foods and limit calories  to 1800 each day. This can be done by choosing foods that are low in calories and fat. The diet also suggests eating small amounts of food frequently. Doing this helps control your blood glucose levels so they do not get too high or too low. Each meal or snack may include a protein food source to help you feel more satisfied and to stabilize your blood glucose. Try to eat about the same amount of food around the same time each day. This includes weekend days, travel days, and days off work. Space your meals about 4 to 5 hours apart and add a snack between them if you wish.   For example, a daily food plan could include breakfast, a morning snack, lunch, dinner, and an evening snack. Healthy meals and snacks include whole grains, vegetables, fruits, lean meats, poultry, fish, and dairy products. As you plan your meals, select a variety of foods. Choose from the bread and starch, vegetable, fruit, dairy, and meat/protein groups. Examples of foods from each group and their suggested serving sizes are listed below. Use measuring cups and spoons to become familiar with what a healthy portion looks like.  Bread and Starch  Each serving equals 15 grams of carbohydrates.  2. 1 slice bread.   3. ¼ bagel.   4. ¾ cup cold cereal (unsweetened).   5. ½ cup hot cereal or mashed potatoes.   6. 1 small potato (size of a computer mouse).   7.  cup cooked pasta or rice.   8. ½ English muffin.   9. 1 cup broth-based soup.   10. 3 cups of popcorn.   11. 4 to 6 whole-wheat crackers.   12. ½ cup cooked beans, peas, or corn.   Vegetable  Each serving equals 5 grams of carbohydrates.  2. ½ cup cooked vegetables.   3. 1 cup raw vegetables.   4. ½ cup tomato or vegetable juice.   Fruit  Each serving equals 15 grams of carbohydrates.  2. 1 small apple or orange.   3. 1¼ cup watermelon or strawberries.   4. ½ cup applesauce (no sugar added).   5. 2 tbs raisins.   6. ½ banana.   7. ½ cup canned fruit, packed in water, its own juice, or  sweetened with a sugar substitute.   8. ½ cup unsweetened fruit juice.   Dairy  Each serving equals 12 to 15 grams of carbohydrates.  2. 1 cup fat-free milk.   3. 6 oz artificially sweetened yogurt or plain yogurt.   4. 1 cup low-fat buttermilk.   5. 1 cup soy milk.   6. 1 cup almond milk.   Meat/Protein  · 1 large egg.   · 2 to 3 oz meat, poultry, or fish.   · ¼ cup low-fat cottage cheese.   · 1 tbs peanut butter.   · 1 oz low-fat cheese.   · ¼ cup tuna in water.   · ½ cup tofu.   Fat  · 1 tsp oil.   · 1 tsp trans-fat-free margarine.   · 1 tsp butter.   · 1 tsp mayonnaise.   · 2 tbs avocado.   · 1 tbs salad dressing.   · 1 tbs cream cheese.   · 2 tbs sour cream.   SAMPLE 1800 CALORIE DIET PLAN  Breakfast  · ¾ cup unsweetened cereal (1 carb serving).   · 1 cup fat-free milk (1 carb serving).   · 1 slice whole-wheat toast (1 carb serving).   · ½ small banana (1 carb serving).   · 1 scrambled egg.   · 1 tsp trans-fat-free margarine.   Lunch  · Tuna sandwich.   · 2 slices whole-wheat bread (2 carb servings).   · ½ cup canned tuna in water, drained.   · 1 tbs reduced fat mayonnaise.   · 1 stalk celery, chopped.   · 2 slices tomato.   · 1 lettuce leaf.   · 1 cup carrot sticks.   · 24 to 30 seedless grapes (2 carb servings).   · 6 oz light yogurt (1 carb serving).   Afternoon Snack  · 3 rell cracker squares (1 carb serving).   · Fat-free milk, 1 cup (1 carb serving).   · 1 tbs peanut butter.   Dinner  · 3 oz salmon, broiled with 1 tsp oil.   · 1 cup mashed potatoes (2 carb servings) with 1 tsp trans-fat-free margarine.   · 1 cup fresh or frozen green beans.   · 1 cup steamed asparagus.   · 1 cup fat-free milk (1 carb serving).   Evening Snack  · 3 cups air-popped popcorn (1 carb serving).   · 2 tbs parmesan cheese sprinkled on top.   MEAL PLAN  Use this worksheet to help you make a daily meal plan based on the 1800 calorie diet suggestions. If you are using this plan to help you control your blood glucose, you may  interchange carbohydrate-containing foods (dairy, starches, and fruits). Select a variety of fresh foods of varying colors and flavors. The total amount of carbohydrate in your meals or snacks is more important than making sure you include all of the food groups every time you eat. Choose from the following foods to build your day's meals:  · 8 Starches.   · 4 Vegetables.   · 3 Fruits.   · 2 Dairy.   · 6 to 7 oz Meat/Protein.   · Up to 4 Fats.   Your dietician can use this worksheet to help you decide how many servings and which types of foods are right for you.  BREAKFAST  Food Group and Servings / Food Choice  Starch ________________________________________________________  Dairy _________________________________________________________  Fruit _________________________________________________________  Meat/Protein __________________________________________________  Fat ___________________________________________________________  LUNCH  Food Group and Servings / Food Choice  Starch ________________________________________________________  Meat/Protein __________________________________________________  Vegetable _____________________________________________________  Fruit _________________________________________________________  Dairy _________________________________________________________  Fat ___________________________________________________________  AFTERNOON SNACK  Food Group and Servings / Food Choice  Starch ________________________________________________________  Meat/Protein __________________________________________________  Fruit __________________________________________________________  Dairy _________________________________________________________  DINNER  Food Group and Servings / Food Choice  Starch _________________________________________________________  Meat/Protein ___________________________________________________  Dairy  __________________________________________________________  Vegetable ______________________________________________________  Fruit ___________________________________________________________  Fat ____________________________________________________________  EVENING SNACK  Food Group and Servings / Food Choice  Fruit __________________________________________________________  Meat/Protein ___________________________________________________  Dairy __________________________________________________________  Starch _________________________________________________________  DAILY TOTALS  Starch ____________________________  Vegetable _________________________  Fruit _____________________________  Dairy _____________________________  Meat/Protein______________________  Fat _______________________________  Document Released: 07/10/2006 Document Revised: 2013 Document Reviewed: 2012  [employer]® Patient Information  [employer].       Total Hip Replacement  Total hip replacement is a surgery to replace your damaged hip joint. Your hip joint is replaced with a man-made (artificial) hip joint. This man-made hip joint is called a prosthesis. This surgery is done to lessen pain and improve movement.   BEFORE THE PROCEDURE   · Do not eat or drink anything after midnight on the night before the procedure or as told by your doctor.  · Ask your doctor about:  1. Changing or stopping your normal medicines. This is important if you take diabetes medicines or blood thinners.  2. Taking aspirin or ibuprofen medicines. These thin your blood. Do not take these medicines if your doctor tells you not to.  · Plan to have someone take you home after the procedure.  PROCEDURE   13. An IV tube will be put into one of your veins.  14. You will be given one or more of the followin. A medicine that makes you relaxed (sedative).  2. A medicine that makes you fall asleep (general anesthetic).  3. A medicine that numbs your  body below the waist (spinal anesthetic).  15. A cut (incision) will be made in your hip. Your surgeon will take out any damaged parts of your hip joint.  16. Your surgeon will then:  1. Put a man-made hip joint into your pelvic bone. Screws may be used to keep the hip joint in place.  2. Take out the damaged ball of your thigh bone (femur). A man-made ball on a metal pole will replace the damaged ball.  3. The ball will be put into the new socket to make a new hip joint. Your hip joint will be checked to see if it moves as it should.  4. Close the cut and place a bandage over it.  AFTER THE PROCEDURE   5. You will stay in a recovery area until your medicines wear off.  6. Your nurse will monitor your vital signs, such as your pulse and blood pressure.  7. Once you are doing okay, you will be taken to your hospital room.  8. You may have to wear socks that prevent blood clots and lessen swelling. These are called compression stockings.  9. You will do exercise therapy (physical therapy) until you are doing well. Your doctor will tell you when you are well enough to go home.     This information is not intended to replace advice given to you by your health care provider. Make sure you discuss any questions you have with your health care provider.     Document Released: 03/11/2013 Document Revised: 01/08/2016 Document Reviewed: 02/18/2015  [employer] Interactive Patient Education ©2016 [employer]    Total Hip Replacement, Care After  These instructions give you information on caring for yourself after your procedure. Your doctor also may give you specific instructions. Call your doctor if you have any problems or questions after your procedure.  HOME CARE   Your doctor will give you instructions on what types of movements are okay and not okay.   · Take medicines as told by your doctor.  · Take quick showers (3-5 minutes). Avoid baths until your doctor says it is okay.  · Avoid lifting until your doctor says it is  okay.  · Use a raised toilet as told by your doctor.  · Avoid sitting in low chairs as told by your doctor.  · Use crutches or a walker as told by your doctor.  GET HELP IF:  17. You have trouble breathing.  18. You have fluid coming from your surgery site.  19. You have redness or swelling at your surgery site.  20. You have a bad smell coming from your surgery site.  21. You have bleeding that will not stop.  22. Your surgical cut opens up after sutures (stitches) or staples are removed.  23. You have a fever.  GET HELP RIGHT AWAY IF:  10. You have a rash.  11. You have pain or puffiness on your thigh or on the back of your lower leg.  12. You have shortness of breath or chest pain.  MAKE SURE YOU:  9. Understand these instructions.  10. Will watch your condition.  11. Will get help right away if you are not doing well or get worse.     This information is not intended to replace advice given to you by your health care provider. Make sure you discuss any questions you have with your health care provider.     Document Released: 03/11/2013 Document Revised: 01/08/2016 Document Reviewed: 02/18/2015  [employer] Interactive Patient Education ©2016 [employer]    Hip Rehabilitation After Surgery  Exercising your hip can greatly improve the results of your hip surgery. The exercises described here are designed to help you keep full movement of your hip joint.  HOW SHOULD I EXERCISE MY HIP?  The following exercises can be done on a training mat, on the floor, on a table, or on a bed. Use whatever works best and is most comfortable for you. Perform all exercises about fifteen times on each side, three times per day or as directed.  · Lying on your back, slowly slide your foot toward your buttocks, raising your knee up off the floor. Then slowly slide your foot back down until your leg is straight again.  · Lying on your back, spread your legs as far apart as you can without feeling discomfort.  · Lying on your side, raise  your leg straight up from the floor as far as is comfortable. Slowly lower the leg.  · Lying on your back, tighten up the muscle in the front of your thigh (quadriceps). You can do this by keeping your leg straight and trying to raise your heel off the floor. This helps strengthen the largest muscle supporting your knee.  · Lying on your back, tighten up the muscles of your buttocks both with the legs straight and with the knee bent at a comfortable angle while keeping your heel on the floor.  · Lying on your stomach, lift your toes off the floor toward your buttocks. Bend your knee as far as is comfortable. Tighten the muscles in your buttocks while doing this.  Follow all safety measures that are given to protect your hip. If any of these exercises cause increased pain or swelling in your joint, decrease the exercises until you are comfortable again. Then slowly increase the exercises. Call your health care provider if you have problems or questions.      This information is not intended to replace advice given to you by your health care provider. Make sure you discuss any questions you have with your health care provider.     Document Released: 07/21/2005 Document Revised: 01/08/2016 Document Reviewed: 03/19/2015  [employer] Interactive Patient Education ©2016 [employer]    Pain Medicine Instructions  HOW CAN PAIN MEDICINE AFFECT ME?  You were prescribed pain medicine. This medicine may:  · Make you tired or sleepy.  · Affect how well you can:  1. Drive  2. Do certain activities.  Pain medicine may not make all of your pain go away. You should be comfortable enough to:  24. Move.  25. Breathe.  26. Take care of yourself.  HOW OFTEN SHOULD I TAKE PAIN MEDICINE AND HOW MUCH SHOULD I TAKE?  13. Take pain medicine only as told by your doctor and only as needed for pain.  14. You do not need to take pain medicine if you are not having pain, unless your doctor tells you to do that.  15. You can take less than the  prescribed dose if you find that less medicine helps your pain.  WHAT SHOULD I AVOID WHILE I AM TAKING PAIN MEDICINE?  Follow these instructions after you start taking pain medicine, while you are taking the medicine, and for 8 hours after you stop taking the medicine:  12. Do not drive.  13. Do not use machinery.  14. Do not use power tools.  15. Do not sign legal documents.  16. Do not drink alcohol.  17. Do not take sleeping pills.  18. Do not take care of children by yourself.  19. Do not do any activities that involve climbing or being in high places.  20. Do not go into any body of water unless there is an adult nearby who can watch and help you. This includes:  1. Lakes.  2. Rivers.  3. Oceans.  4. Spas.  5. Swimming pools.  HOW CAN I KEEP OTHERS SAFE WHILE I AM TAKING PAIN MEDICINE?  7. Store your pain medicine as told by your doctor. Make sure that you keep it where children and pets cannot reach it.  8. Do not share your pain medicine with anyone.  9. Do not save any leftover pills. If you have any leftover pain medicine, get rid of it or destroy it as told by your doctor.  WHAT ELSE DO I NEED TO KNOW ABOUT TAKING PAIN MEDICINE?  · Use a poop (stool) softener if you have trouble pooping (constipation) because of your pain medicine. Eating more fruits and vegetables also helps with constipation.  · Write down the times when you take your pain medicine. Look at the times before you take your next dose of medicine.  · If your pain is very bad, do not take more pills than told by your doctor. Call your doctor for help.  · Your pain medicine might have acetaminophen in it. Do not take any other acetaminophen while you are taking this medicine. An overdose of acetaminophen can do very bad damage to your liver. If you are taking any medicines in addition to your pain medicine, check the active ingredients on those medicines to see if acetaminophen is listed.  WHEN SHOULD I CALL MY DOCTOR?  · Your medicine is not  helping the pain.  · You do either of these soon after you take the medicine:  1. Throw up (vomit).  2. Have watery poop (diarrhea).  · You have new pain in areas that did not hurt before.  · You have an allergic reaction to your medicine. This may include:  1. Feeling itchy.  2. Swelling.  3. Feeling dizzy.  4. Getting a new rash.  WHEN SHOULD I CALL 911 OR GO TO THE EMERGENCY ROOM?  · You feel dizzy or you faint.  · You feel very confused.  · You throw up again and again.  · Your skin or lips turn pale or bluish in color.  · You are:  1. Short of breath.  2. Breathing much more slowly than usual.  · You have a very bad allergic reaction to your medicine. This includes:  1. Developing a swollen tongue.  2. Having trouble breathing.     This information is not intended to replace advice given to you by your health care provider. Make sure you discuss any questions you have with your health care provider.     Document Released: 06/05/2009 Document Revised: 05/03/2016 Document Reviewed: 10/22/2015  [employer] Interactive Patient Education ©2016 [employer]    Infection Control in the Home  If you have an infection or you are taking care of someone who has an infection, it is important to know how to keep the infection from spreading. Follow these guidelines to help stop the spread of infection, and talk to your health care provider.  HOW ARE INFECTIONS SPREAD?  In order for an infection to spread, the following must be present:  · A germ. This may be a virus, bacteria, fungus, or parasite.  · A place for the germ to live. This may be:  1. On or in a person, animal, plant, or food.  2. In soil or water.  3. On surfaces, such as a door handle.  · A susceptible host. This is a person or animal who does not have resistance (immunity) to the germ.  · A way for the germ to enter the host. This may occur by:  1. Direct contact. This may happen by making contact--such as shaking hands or hugging--with an infected person or  animal. Some germs can also travel through the air and spread to you if an infected person coughs or sneezes on you or near you.  2. Indirect contact. This is when the germ enters the host through contact with an infected object. Examples include eating contaminated food, drinking contaminated water, or touching a contaminated surface with your hands and then touching your face, nose, or mouth soon after that.  HOW CAN I HELP TO PREVENT INFECTION FROM SPREADING?  There are several things that you can do to help prevent infection from spreading.  Hand Washing  It is very important to wash your hands correctly, following these steps:  27. Wet your hands with clean, running water.  28. Apply soap to your hands. Liquid soap is better than bar soap.  29. Rub your hands together quickly to create lather.  30. Keep rubbing your hands together for at least 20 seconds. Thoroughly scrub all parts of your hands, including under your fingernails and between your fingers.  31. Rinse your hands with clean, running water until all of the soap is gone.  32. Dry your hands with an air dryer or a clean paper or cloth towel, or let your hands air-dry. Do not use your clothing or a soiled towel to dry your hands.  33. If you are in a public restroom, use your towel to turn off the water faucet and to open the bathroom door.  Make sure to wash your hands:  16. Before:  1. Visiting a baby or anyone with a weakened or lowered defense (immune) system.  2. Putting in and taking out any contact lenses.  17. After:  1. Working or playing outside.  2. Touching an animal or its toys or leash.  3. Handling livestock.  4. Using the bathroom or helping a child or adult to use the bathroom.  5. Using household  or toxic chemicals.  6. Touching or taking out the garbage.  7. Touching anything dirty around your home.  8. Handling soiled clothes or rags.  9. Taking care of a sick child. This includes touching used tissues, toys, and  clothes.  10. Sneezing, coughing, or blowing your nose.  11. Using public transportation.  12. Shaking hands.  13. Using a phone, including your mobile phone.  14. Touching money.  18. Before and after:  1. Preparing food.  2. Preparing a bottle for a baby.  3. Feeding a baby or a young child.  4. Eating.  5. Visiting or taking care of someone who is sick.  6. Changing a diaper.  7. Changing a bandage (dressing) or taking care of an injury or wound.  8. Giving or taking medicine.  Taking Care of Your Home  21. Make sure that you have enough cleaning supplies at all times. These include:  1. Disinfectants.  2. Reusable cleaning cloths. Wash these after each use.  3. Paper towels.  4. Utility gloves. Replace your gloves if they are cracked or torn or if they start to peel.  22. Use bleach safely. Never mix it with other cleaning products, especially those that contain ammonia. This mixture can create a dangerous gas that may be deadly.  23. Take care of your cleaning supplies. Toilet brushes, mops, and sponges can breed germs. Soak them in bleach and water for 5 minutes after each use.  24. Do not pour used mop water down the sink. Pour it down the toilet instead.  25. Maintain proper ventilation in your home.  26. If you have a pet, ensure that your pet stays clean. Do not let people with weak immune systems touch bird droppings, fish tank water, or a litter box.  1. If you have a cat, be sure to change the litter every day.  27. In the bathroom, make sure you:  1. Provide liquid soap.  2. Change towels and washcloths frequently. Avoid sharing towels and washcloths.  3. Change toothbrushes often and store them separately in a clean, dry place.  4. Disinfect the toilet.  5. Clean the tub, shower, and sink with standard cleaning products.    6. Mop the floor with a standard .  7. Do not share personal items, such as razors, toothbrushes, drinking glasses, deodorant, gregorio, brushes, towels, and washcloths.     28. In the kitchen, make sure you:  1. Store food carefully.  2. Refrigerate leftovers promptly in covered containers.  3. Throw out stale or spoiled food.  4. Clean the inside of your refrigerator each week.  5. Keep your refrigerator set at 40°F (4°C) or less, and set your freezer at 0°F (-18°C) or less.  6. Thaw foods in the refrigerator or microwave, not at room temperature.  7. Serve foods at the proper temperature. Do not eat raw meat. Make sure it is cooked to the appropriate temperature. Cook eggs until they are firm.  8. Wash fruits and vegetables under running water.  9. Use separate cutting boards, plates, and utensils for raw foods and cooked foods.  10. Keep work surfaces clean.  11. Use a clean spoon each time you sample food while cooking.  12. Wash your dishes in hot, soapy water. Air-dry your dishes or use a .  13. Do not share forks, cups, or spoons during meals.  29. Wear gloves if laundry is visibly soiled.  30. Change linens each week or whenever they are soiled.  31. Do not shake soiled linens. Doing that may send germs into the air. Put dressings, sanitary or incontinence pads, diapers, and gloves in plastic garbage bags for disposal.     This information is not intended to replace advice given to you by your health care provider. Make sure you discuss any questions you have with your health care provider.     Document Released: 09/26/2009 Document Revised: 01/08/2016 Document Reviewed: 08/20/2015  [employer] Interactive Patient Education ©2016 [employer]

## 2017-07-07 NOTE — PROGRESS NOTES
Discharge to home with her daughter. Pt signed a copy of the discharge papers and confirms all questions have been answered by the MD or the RN. Second copy of d/c papers in chart. 2 Prescriptions provided to pt, and filled at [facility]. IV discontinued. Pt states all person belongings are in possession. Pt escorted off unit with assistance from the CNA staff in a wheelchair without incident. Pt room has been stripped and is ready to be cleaned. No belongings left behind.

## 2017-07-07 NOTE — PROGRESS NOTES
"Patient seen and examined  No complaints    Blood pressure 126/71, pulse 84, temperature 36.7 °C (98 °F), resp. rate 18, height 1.664 m (5' 5.51\"), weight 90.3 kg (199 lb 1.2 oz), SpO2 100 %, not currently breastfeeding.    Recent Labs      07/07/17   0239   HEMOGLOBIN  10.6*   HEMATOCRIT  31.5*       No acute distress  Dressing clean dry and intact  Neurovascularly intact      Plan:  Doing well  Discharge home.              "

## 2017-07-07 NOTE — CARE PLAN
Problem: Risk for Impaired Mobility  Goal: Mobilization  Outcome: PROGRESSING AS EXPECTED  Pt was up and ambulating the goel with the CNA, she tolerated it very well and with stand by assist and FWW    Problem: Elimination  Goal: Regular urinary elimination  Outcome: PROGRESSING AS EXPECTED  Pt is up to the restroom with stand by assist and states no issues voiding at this time.

## 2017-07-07 NOTE — DISCHARGE PLANNING
Care Transition Team Assessment    IHD met with pt and daughter at bedside. Pt is currently using a fww, and does not feel as though she needs any O2 or home health services.    Information Source  Orientation : Oriented x 4  Information Given By: Patient  Informant's Name: Morgan  Who is responsible for making decisions for patient? : Patient    Readmission Evaluation  Is this a readmission?: No    Elopement Risk  Legal Hold: No  Ambulatory or Self Mobile in Wheelchair: Yes  Disoriented: No  Psychiatric Symptoms: None  History of Wandering: No  Elopement this Admit: No  Vocalizing Wanting to Leave: No  Displays Behaviors, Body Language Wanting to Leave: No-Not at Risk for Elopement  Elopement Risk: Not at Risk for Elopement    Interdisciplinary Discharge Planning  Does Admitting Nurse Feel This Could be a Complex Discharge?: No  Primary Care Physician: Jaya flynn  Lives with - Patient's Self Care Capacity: Other (Comments)  Support Systems: Children, Friends / Neighbors, Family Member(s)  Housing / Facility: [address]  Do You Take your Prescribed Medications Regularly: Yes  Able to Return to Previous ADL's: Yes  Mobility Issues: Yes  Prior Services: None  Patient Expects to be Discharged to:: home  Assistance Needed: No  Durable Medical Equipment: Not Applicable    Discharge Preparedness  What is your plan after discharge?: Home with help  What are your discharge supports?: Child, Sibling  Prior Functional Level: Drives Self, Independent with Activities of Daily Living, Independent with Medication Management  Difficulity with ADLs: None  Difficulity with IADLs: None    Functional Assesment  Prior Functional Level: Drives Self, Independent with Activities of Daily Living, Independent with Medication Management    Finances  Financial Barriers to Discharge: No  Prescription Coverage: Yes (Mary)    Vision / Hearing Impairment  Right Eye Vision: Wears Glasses  Left Eye Vision: Wears Glasses    Values /  Beliefs / Concerns  Values / Beliefs Concerns : No  Spiritual Requests During Hospitalization: No  Special Hospitalization Concerns: none         Domestic Abuse  Have you ever been the victim of abuse or violence?: No  Physical Abuse or Sexual Abuse: No  Verbal Abuse or Emotional Abuse: No  Possible Abuse Reported to:: Not Applicable    Psychological Assessment  History of Substance Abuse: None  History of Psychiatric Problems: No  Non-compliant with Treatment: No  Newly Diagnosed Illness: No    Discharge Risks or Barriers  Discharge risks or barriers?: No    Anticipated Discharge Information  Anticipated discharge disposition: Home  Discharge Address: [address] [place] [address] [address]  Discharge Contact Phone Number: [phone]

## 2017-07-11 ENCOUNTER — PATIENT OUTREACH (OUTPATIENT)
Dept: HEALTH INFORMATION MANAGEMENT | Facility: OTHER | Age: 72
End: 2017-07-11

## 2017-07-11 NOTE — PATIENT INSTRUCTIONS
Discharge Follow Up Call    • Is your pain manageable?  Yes    • How are you taking your pain medication? Not very often, pain has been minimal    • Are you using ice packs? Yes  o How often? On and off     • Are you elevating your leg?  Yes  o How often and how high? When resting    • Are you moving/repositioning frequently?  Yes  o How often? Walking around the house without any issues    • Have you had a bowel movement?  Yes   o Educate on need for laxatives if >3 days since BM, fluids, ambulation    • Are you taking your temperature?  No  o Have you had a temperature >101?  No   o Educate on turn, cough, deep breaths    • Any swelling, pain, or redness in calves?  No    • Any burning with urination?  No    • Do you have outpatient physical therapy appointment?   No       Discharge phone call completed. Pt is POD #5. Pain is controlled. Pt denies fever, dysuria, and calf pain or tenderness.   Provided education on laxatives for constipation, coughing, deep breathing, frequent movement, ice, elevation of affected extremity, and monitoring for signs and symptoms of infection. Patient begins outpatient physical therapy after post op appoinment. All questions and concerns answered at this time.

## 2017-08-07 ENCOUNTER — PATIENT MESSAGE (OUTPATIENT)
Dept: MEDICAL GROUP | Facility: MEDICAL CENTER | Age: 72
End: 2017-08-07

## 2017-08-07 DIAGNOSIS — J30.1 SEASONAL ALLERGIC RHINITIS DUE TO POLLEN: ICD-10-CM

## 2017-08-07 RX ORDER — AZELASTINE 1 MG/ML
1 SPRAY, METERED NASAL 2 TIMES DAILY
Qty: 30 ML | Refills: 2 | Status: SHIPPED | OUTPATIENT
Start: 2017-08-07 | End: 2019-06-04

## 2017-09-08 RX ORDER — TRAMADOL HYDROCHLORIDE 50 MG/1
TABLET ORAL
Qty: 120 TAB | Refills: 2 | Status: SHIPPED
Start: 2017-09-08 | End: 2019-01-24

## 2017-12-01 ENCOUNTER — HOSPITAL ENCOUNTER (OUTPATIENT)
Facility: MEDICAL CENTER | Age: 72
End: 2017-12-01
Attending: PHYSICIAN ASSISTANT
Payer: MEDICARE

## 2017-12-01 ENCOUNTER — OFFICE VISIT (OUTPATIENT)
Dept: URGENT CARE | Facility: CLINIC | Age: 72
End: 2017-12-01
Payer: MEDICARE

## 2017-12-01 VITALS
HEART RATE: 92 BPM | WEIGHT: 203 LBS | HEIGHT: 66 IN | SYSTOLIC BLOOD PRESSURE: 100 MMHG | TEMPERATURE: 97.6 F | DIASTOLIC BLOOD PRESSURE: 80 MMHG | RESPIRATION RATE: 16 BRPM | BODY MASS INDEX: 32.62 KG/M2 | OXYGEN SATURATION: 94 %

## 2017-12-01 DIAGNOSIS — E66.9 OBESITY (BMI 30-39.9): ICD-10-CM

## 2017-12-01 DIAGNOSIS — E11.9 TYPE 2 DIABETES MELLITUS WITHOUT COMPLICATION, UNSPECIFIED LONG TERM INSULIN USE STATUS: ICD-10-CM

## 2017-12-01 DIAGNOSIS — N12 PYELONEPHRITIS: ICD-10-CM

## 2017-12-01 LAB
APPEARANCE UR: NORMAL
BILIRUB UR STRIP-MCNC: NORMAL MG/DL
COLOR UR AUTO: NORMAL
GLUCOSE UR STRIP.AUTO-MCNC: 2000 MG/DL
KETONES UR STRIP.AUTO-MCNC: NORMAL MG/DL
LEUKOCYTE ESTERASE UR QL STRIP.AUTO: NORMAL
NITRITE UR QL STRIP.AUTO: NORMAL
PH UR STRIP.AUTO: 7 [PH] (ref 5–8)
PROT UR QL STRIP: NORMAL MG/DL
RBC UR QL AUTO: NORMAL
SP GR UR STRIP.AUTO: 1
UROBILINOGEN UR STRIP-MCNC: NORMAL MG/DL

## 2017-12-01 PROCEDURE — 87077 CULTURE AEROBIC IDENTIFY: CPT

## 2017-12-01 PROCEDURE — 99214 OFFICE O/P EST MOD 30 MIN: CPT | Performed by: PHYSICIAN ASSISTANT

## 2017-12-01 PROCEDURE — 81002 URINALYSIS NONAUTO W/O SCOPE: CPT | Performed by: PHYSICIAN ASSISTANT

## 2017-12-01 PROCEDURE — 87086 URINE CULTURE/COLONY COUNT: CPT

## 2017-12-01 PROCEDURE — 87186 SC STD MICRODIL/AGAR DIL: CPT

## 2017-12-01 RX ORDER — SULFAMETHOXAZOLE AND TRIMETHOPRIM 800; 160 MG/1; MG/1
1 TABLET ORAL EVERY 12 HOURS
Qty: 28 TAB | Refills: 0 | Status: SHIPPED | OUTPATIENT
Start: 2017-12-01 | End: 2017-12-15

## 2017-12-02 DIAGNOSIS — N12 PYELONEPHRITIS: ICD-10-CM

## 2017-12-03 ASSESSMENT — ENCOUNTER SYMPTOMS
FEVER: 0
BACK PAIN: 0
FLANK PAIN: 1
MYALGIAS: 1
CHILLS: 0
PALPITATIONS: 0
SHORTNESS OF BREATH: 0
COUGH: 1

## 2017-12-03 NOTE — PROGRESS NOTES
Subjective:      Morgan Solis is a 72 y.o. female who presents with URI (x1wk, sinus pain, ears congested, and plugged, now feeling in chest) and UTI (lower back pain, urgency, pain, x1wk)            UTI   This is a new problem. The current episode started 1 to 4 weeks ago. The problem occurs constantly. The problem has been gradually worsening. Associated symptoms include coughing, myalgias and urinary symptoms. Pertinent negatives include no chest pain, chills or fever. She has tried nothing for the symptoms.       Review of Systems   Constitutional: Negative for chills and fever.   Respiratory: Positive for cough. Negative for shortness of breath.    Cardiovascular: Negative for chest pain and palpitations.   Genitourinary: Positive for dysuria, flank pain, frequency and urgency. Negative for hematuria.   Musculoskeletal: Positive for myalgias. Negative for back pain.   All other systems reviewed and are negative.    PMH:  has a past medical history of Anesthesia; Arthritis; Bronchitis (2012); Diabetes (CMS-[facility]); Disorder of thyroid; Hiatus hernia syndrome; High cholesterol; Hypertension; and Pain (6/23/17).  MEDS:   Current Outpatient Prescriptions:   •  sulfamethoxazole-trimethoprim (BACTRIM DS) 800-160 MG tablet, Take 1 Tab by mouth every 12 hours for 14 days., Disp: 28 Tab, Rfl: 0  •  azelastine (ASTELIN) 137 MCG/SPRAY nasal spray, Spray 1 Spray in nose 2 times a day., Disp: 30 mL, Rfl: 2  •  aspirin EC 81 MG EC tablet, Take 1 Tab by mouth 2 times a day., Disp: 90 Tab, Rfl: 0  •  glipiZIDE (GLUCOTROL) 5 MG Tab, Take 5 mg by mouth 2 times a day as needed (takes if blood sugar is greater 120)., Disp: , Rfl:   •  lisinopril (PRINIVIL) 10 MG Tab, Take 10 mg by mouth 1 time daily as needed (Pt only takes if blood pressure is 135/85 or higher.)., Disp: , Rfl:   •  Multiple Vitamins-Minerals (MULTIVITAMIN ADULT PO), Take 1 tablet by mouth every bedtime., Disp: , Rfl:   •  Krill Oil 500 MG Cap, Take 1 Cap by  mouth every bedtime., Disp: , Rfl:   •  metformin (GLUCOPHAGE) 500 MG Tab, Take 1 Tab by mouth 2 times a day, with meals., Disp: 180 Tab, Rfl: 1  •  atorvastatin (LIPITOR) 20 MG Tab, Take 1 Tab by mouth every bedtime., Disp: 90 Tab, Rfl: 1  •  levothyroxine (SYNTHROID) 50 MCG Tab, TAKE ONE TABLET BY MOUTH ONCE DAILY IN THE MORNING ON EMPTY STOMACH, Disp: 90 Tab, Rfl: 0  •  tramadol (ULTRAM) 50 MG Tab, TAKE ONE TABLET BY MOUTH TWICE DAILY AS NEEDED FOR  SEVERE  PAIN, Disp: 120 Tab, Rfl: 2  •  acetaminophen (TYLENOL) 500 MG Tab, Take 500 mg by mouth Pre-Op Once., Disp: , Rfl:   •  gabapentin (NEURONTIN) 600 MG tablet, Take 600 mg by mouth Pre-Op Once., Disp: , Rfl:   •  celecoxib (CELEBREX) 200 MG Cap, Take 200 mg by mouth Pre-Op Once., Disp: , Rfl:   •  ciprofloxacin (CIPRO) 500 MG Tab, Take 500 mg by mouth 2 times a day. 10 day course started 6/26/17, Disp: , Rfl:   •  diclofenac EC (VOLTAREN) 75 MG Tablet Delayed Response, Take 75 mg by mouth 2 times a day., Disp: , Rfl:   •  aspirin EC (ECOTRIN) 81 MG Tablet Delayed Response, Take 81 mg by mouth every morning., Disp: , Rfl:   •  Biotin 5000 MCG Cap, Take 1 Capsule by mouth every bedtime., Disp: , Rfl:   •  Turmeric 500 MG Tab, Take 1 tablet by mouth every bedtime., Disp: , Rfl:   •  tramadol (ULTRAM) 50 MG Tab, Take 1 Tab by mouth 2 times a day as needed for Severe Pain., Disp: 120 Tab, Rfl: 2  ALLERGIES:   Allergies   Allergen Reactions   • Anesthetics, Sarah [Esters]      Throat swelling and severe nasal discharge.  Will need atropine and benadryl.   • Cephalosporins      Vomiting, red flushing, palpitations   • Codeine      Pain increases   • Keflex Vomiting     Flushing, tickling in throat, projectile vomiting, tachycardia   Tolerates penicillins   • Mobic [Meloxicam] Anxiety     Felt tingling in her extremities and anxiety.      SURGHX:   Past Surgical History:   Procedure Laterality Date   • HIP ARTH ANTERIOR TOTAL Left 7/6/2017    Procedure: HIP  "ARTHROPLASTY ANTERIOR TOTAL;  Surgeon: Jason Zepeda M.D.;  Location: SURGERY [facility];  Service:    • APPENDECTOMY     • CARPAL TUNNEL RELEASE Left    • CHOLECYSTECTOMY     • HYSTERECTOMY, VAGINAL     • TONSILLECTOMY       SOCHX:  reports that she quit smoking about 12 years ago. Her smoking use included Cigarettes. She has a 35.00 pack-year smoking history. She has never used smokeless tobacco. She reports that she drinks alcohol. She reports that she does not use drugs.  FH: Family history was reviewed, no pertinent findings to report  Medications, Allergies, and current problem list reviewed today in Epic       Objective:     /80   Pulse 92   Temp 36.4 °C (97.6 °F)   Resp 16   Ht 1.676 m (5' 6\")   Wt 92.1 kg (203 lb)   SpO2 94%   BMI 32.77 kg/m²      Physical Exam   Constitutional: She is oriented to person, place, and time. She appears well-developed and well-nourished.   Neck: Normal range of motion. Neck supple.   Cardiovascular: Normal rate, regular rhythm and normal heart sounds.    Pulmonary/Chest: Effort normal and breath sounds normal.   Musculoskeletal: She exhibits no tenderness.   CVA tenderness present   Neurological: She is alert and oriented to person, place, and time.   Skin: Skin is warm and dry.   Psychiatric: She has a normal mood and affect. Her behavior is normal. Judgment and thought content normal.   Vitals reviewed.              Assessment/Plan:     1. Pyelonephritis    - POCT Urinalysis  - Urine Culture; Future  - sulfamethoxazole-trimethoprim (BACTRIM DS) 800-160 MG tablet; Take 1 Tab by mouth every 12 hours for 14 days.  Dispense: 28 Tab; Refill: 0  - HEMOGLOBIN A1C; Future  - MICROALBUMIN CREAT RATIO URINE (LAB COLLECT); Future    2. Obesity (BMI 30-39.9)    - Patient identified as having weight management issue.  Appropriate orders and counseling given.    3. Type 2 diabetes mellitus without complication, unspecified long term insulin use status (CMS-[facility])    - " HEMOGLOBIN A1C; Future  - MICROALBUMIN CREAT RATIO URINE (LAB COLLECT); Future    Differential diagnosis, natural history, supportive care, and indications for immediate follow-up discussed at length.   Follow-up with primary care provider within 4-5 days, emergency room precautions discussed.  Patient and/or family appears understanding of information.

## 2017-12-04 LAB
BACTERIA UR CULT: ABNORMAL
SIGNIFICANT IND 70042: ABNORMAL
SOURCE SOURCE: ABNORMAL

## 2017-12-09 ENCOUNTER — PATIENT OUTREACH (OUTPATIENT)
Dept: HEALTH INFORMATION MANAGEMENT | Facility: OTHER | Age: 72
End: 2017-12-09

## 2017-12-09 NOTE — PROGRESS NOTES
Attempt #:1    WebIZ Checked & Epic Updated: Yes  HealthConnect Verified: yes  Verify PCP: yes    Communication Preference Obtained: yes     Review Care Team: yes       Care Gap Scheduling (Attempt to Schedule EACH Overdue Care Gap!)     Health Maintenance Due   Topic Date Due   • RETINAL SCREENING  11/26/1965   • URINE ACR / MICROALBUMIN  11/26/1965   • IMM DTaP/Tdap/Td Vaccine (1 - Tdap) 11/26/1966   • MAMMOGRAM  11/26/1987   • COLONOSCOPY  11/26/1997   • IMM ZOSTER VACCINE  11/26/2007   • BONE DENSITY  11/26/2012   • A1C SCREENING  02/11/2016   • FASTING LIPID PROFILE  08/11/2016        Patient declined Mammogram/ dexa/ colonoscopy- pt declined mammogram-stated she will be out of town until next year and will call back when ready to schedule/provided pt with call back number.  ANIKA sent for retinal exam.   Pt to go into [facility] lab to complete lab work       Individual Digital Activation: already active  Individual Digital Misti: no  Virtual Visits: no  Opt In to Text Messages: no

## 2018-01-12 ENCOUNTER — TELEPHONE (OUTPATIENT)
Dept: MEDICAL GROUP | Facility: MEDICAL CENTER | Age: 73
End: 2018-01-12

## 2018-01-12 DIAGNOSIS — E11.9 TYPE 2 DIABETES MELLITUS WITHOUT COMPLICATION, WITHOUT LONG-TERM CURRENT USE OF INSULIN (HCC): ICD-10-CM

## 2018-01-12 NOTE — TELEPHONE ENCOUNTER
Patient is not following up for her diabetes. I am placing a referral to endocrinology to manage her diabetes.  Kang Lofton M.D.

## 2018-04-10 ENCOUNTER — APPOINTMENT (OUTPATIENT)
Dept: OTHER | Facility: IMAGING CENTER | Age: 73
End: 2018-04-10

## 2018-04-19 NOTE — PROGRESS NOTES
Attempt #:FINAL    WebIZ Checked & Epic Updated: no  HealthConnect Verified: no  Verify PCP: yes    Communication Preference Obtained: yes     Review Care Team: yes    Annual Wellness Visit Scheduling  1. Scheduling Status:Not Scheduled. Patient states they are not interested          Care Gap Scheduling (Attempt to Schedule EACH Overdue Care Gap!)  Health Maintenance Due   Topic Date Due   • RETINAL SCREENING  11/26/1965   PT DECLINED      • URINE ACR / MICROALBUMIN  11/26/1965   PT DECLINED      • IMM DTaP/Tdap/Td Vaccine (1 - Tdap) 11/26/1966   PT DECLINED      • MAMMOGRAM  11/26/1987   PT DECLINED      • COLONOSCOPY  11/26/1997   PT DECLINED      • BONE DENSITY  11/26/2012   PT DECLINED      • A1C SCREENING  02/11/2016   PT DECLINED      • FASTING LIPID PROFILE  08/11/2016   PT DECLINED      • DIABETES MONOFILAMENT / LE EXAM  03/28/2018  PT DECLINED              MyChart Activation: already active

## 2018-05-04 ENCOUNTER — OFFICE VISIT (OUTPATIENT)
Dept: URGENT CARE | Facility: CLINIC | Age: 73
End: 2018-05-04
Payer: MEDICARE

## 2018-05-04 ENCOUNTER — HOSPITAL ENCOUNTER (OUTPATIENT)
Facility: MEDICAL CENTER | Age: 73
End: 2018-05-04
Attending: PHYSICIAN ASSISTANT
Payer: MEDICARE

## 2018-05-04 VITALS
HEART RATE: 87 BPM | BODY MASS INDEX: 31.82 KG/M2 | HEIGHT: 66 IN | TEMPERATURE: 98 F | DIASTOLIC BLOOD PRESSURE: 80 MMHG | SYSTOLIC BLOOD PRESSURE: 100 MMHG | OXYGEN SATURATION: 94 % | RESPIRATION RATE: 16 BRPM | WEIGHT: 198 LBS

## 2018-05-04 DIAGNOSIS — E78.00 PURE HYPERCHOLESTEROLEMIA: ICD-10-CM

## 2018-05-04 DIAGNOSIS — N30.90 CYSTITIS: ICD-10-CM

## 2018-05-04 DIAGNOSIS — E03.9 HYPOTHYROIDISM, UNSPECIFIED TYPE: ICD-10-CM

## 2018-05-04 DIAGNOSIS — E11.9 TYPE 2 DIABETES MELLITUS WITHOUT COMPLICATION, WITHOUT LONG-TERM CURRENT USE OF INSULIN (HCC): ICD-10-CM

## 2018-05-04 LAB
APPEARANCE UR: NORMAL
BILIRUB UR STRIP-MCNC: NORMAL MG/DL
COLOR UR AUTO: NORMAL
GLUCOSE UR STRIP.AUTO-MCNC: NORMAL MG/DL
KETONES UR STRIP.AUTO-MCNC: NORMAL MG/DL
LEUKOCYTE ESTERASE UR QL STRIP.AUTO: NORMAL
NITRITE UR QL STRIP.AUTO: NORMAL
PH UR STRIP.AUTO: 6 [PH] (ref 5–8)
PROT UR QL STRIP: NORMAL MG/DL
RBC UR QL AUTO: NORMAL
SP GR UR STRIP.AUTO: 1.01
UROBILINOGEN UR STRIP-MCNC: NORMAL MG/DL

## 2018-05-04 PROCEDURE — 87077 CULTURE AEROBIC IDENTIFY: CPT

## 2018-05-04 PROCEDURE — 99214 OFFICE O/P EST MOD 30 MIN: CPT | Performed by: PHYSICIAN ASSISTANT

## 2018-05-04 PROCEDURE — 87186 SC STD MICRODIL/AGAR DIL: CPT

## 2018-05-04 PROCEDURE — 87086 URINE CULTURE/COLONY COUNT: CPT

## 2018-05-04 PROCEDURE — 81002 URINALYSIS NONAUTO W/O SCOPE: CPT | Performed by: PHYSICIAN ASSISTANT

## 2018-05-04 RX ORDER — SULFAMETHOXAZOLE AND TRIMETHOPRIM 800; 160 MG/1; MG/1
1 TABLET ORAL 2 TIMES DAILY
Qty: 20 TAB | Refills: 0 | Status: SHIPPED | OUTPATIENT
Start: 2018-05-04 | End: 2018-05-14

## 2018-05-04 RX ORDER — GLIPIZIDE 5 MG/1
TABLET ORAL
Qty: 180 TAB | Refills: 0 | Status: SHIPPED | OUTPATIENT
Start: 2018-05-04 | End: 2019-02-04 | Stop reason: SDUPTHER

## 2018-05-04 RX ORDER — ATORVASTATIN CALCIUM 20 MG/1
TABLET, FILM COATED ORAL
Qty: 90 TAB | Refills: 0 | Status: SHIPPED | OUTPATIENT
Start: 2018-05-04 | End: 2018-09-27 | Stop reason: SDUPTHER

## 2018-05-04 ASSESSMENT — ENCOUNTER SYMPTOMS
SENSORY CHANGE: 0
FOCAL WEAKNESS: 0
TINGLING: 0
COUGH: 0
DIARRHEA: 0
BACK PAIN: 0
FLANK PAIN: 1
FEVER: 0
VOMITING: 0
NAUSEA: 0
HEADACHES: 0
CHILLS: 0
SHORTNESS OF BREATH: 0
ABDOMINAL PAIN: 0
PALPITATIONS: 0

## 2018-05-04 ASSESSMENT — PATIENT HEALTH QUESTIONNAIRE - PHQ9: CLINICAL INTERPRETATION OF PHQ2 SCORE: 0

## 2018-05-04 NOTE — PROGRESS NOTES
"Subjective:      Morgan Solis is a 72 y.o. female who presents with UTI (x2wks, tried azo and cranberry without relief, now going to pos kidney pain/lower back)            Dysuria    This is a new problem. Episode onset: 2 weeks  The problem occurs every urination. The problem has been unchanged. The quality of the pain is described as burning. The pain is moderate. There has been no fever. There is a history of pyelonephritis. Associated symptoms include flank pain (left flank pain ), frequency and urgency. Pertinent negatives include no chills, hematuria, hesitancy, nausea or vomiting. Her past medical history is significant for recurrent UTIs. There is no history of kidney stones.       Past Medical History:   Diagnosis Date   • Anesthesia     \"sinus drainage, throat swelling, must be intubated by mouth, needs atropine and benadryl every time I have anesthesia\"   • Arthritis     osteoarthritis, hips and fingers   • Bronchitis 2012   • Diabetes (HCC)     oral medication   • Disorder of thyroid     hypothyroid   • Hiatus hernia syndrome     improved   • High cholesterol    • Hypertension    • Pain 6/23/17    left hip, left leg       Past Surgical History:   Procedure Laterality Date   • HIP ARTH ANTERIOR TOTAL Left 7/6/2017    Procedure: HIP ARTHROPLASTY ANTERIOR TOTAL;  Surgeon: Jason Zepeda M.D.;  Location: SURGERY [facility];  Service:    • APPENDECTOMY     • CARPAL TUNNEL RELEASE Left    • CHOLECYSTECTOMY     • HYSTERECTOMY, VAGINAL     • TONSILLECTOMY         Family History   Problem Relation Age of Onset   • Lung Disease Mother    • Diabetes Father      Diabetes type 1   • Heart Disease Father    • Arthritis Sister    • Thyroid Sister    • Hypertension Sister        Allergies   Allergen Reactions   • Anesthetics, Sarah [Esters]      Throat swelling and severe nasal discharge.  Will need atropine and benadryl.   • Cephalosporins      Vomiting, red flushing, palpitations   • Codeine      Pain " "increases   • Keflex Vomiting     Flushing, tickling in throat, projectile vomiting, tachycardia   Tolerates penicillins   • Mobic [Meloxicam] Anxiety     Felt tingling in her extremities and anxiety.        Medications, Allergies, and current problem list reviewed today in Epic      Review of Systems   Constitutional: Negative for chills, fever and malaise/fatigue.   Respiratory: Negative for cough and shortness of breath.    Cardiovascular: Negative for chest pain, palpitations and leg swelling.   Gastrointestinal: Negative for abdominal pain, diarrhea, nausea and vomiting.   Genitourinary: Positive for dysuria, flank pain (left flank pain ), frequency and urgency. Negative for hematuria and hesitancy.   Musculoskeletal: Negative for back pain.   Skin: Negative for rash.   Neurological: Negative for tingling, sensory change, focal weakness and headaches.     All other systems reviewed and are negative.        Objective:     /80   Pulse 87   Temp 36.7 °C (98 °F)   Resp 16   Ht 1.676 m (5' 6\")   Wt 89.8 kg (198 lb)   SpO2 94%   BMI 31.96 kg/m²      Physical Exam   Constitutional: She is oriented to person, place, and time. She appears well-developed and well-nourished. No distress.   HENT:   Head: Normocephalic and atraumatic.   Eyes: Conjunctivae are normal.   Cardiovascular: Normal rate, regular rhythm and normal heart sounds.  Exam reveals no gallop and no friction rub.    No murmur heard.  Pulmonary/Chest: Effort normal and breath sounds normal. No respiratory distress. She has no wheezes. She has no rales.   Abdominal: Soft. Bowel sounds are normal. She exhibits no distension and no mass. There is no hepatosplenomegaly. There is no tenderness. There is CVA tenderness (mild CVA tenderness on the right ). There is no rigidity, no rebound, no guarding, no tenderness at McBurney's point and negative Lopez's sign.   Neurological: She is alert and oriented to person, place, and time. No cranial nerve " deficit.   Psychiatric: She has a normal mood and affect. Her behavior is normal. Judgment and thought content normal.        UA: positive leuks and blood- AZO       Assessment/Plan:     1. Cystitis- complicated         •  sulfamethoxazole-trimethoprim (BACTRIM DS) 800-160 MG tablet, Take 1 Tab by mouth 2 times a day for 10 days., Disp: 20 Tab, Rfl: 0    Will culture urine and change treatment plan appropriately  Encouraged patient to push fluids.     Differential diagnoses, Supportive care, and indications for immediate follow-up discussed with patient.   Instructed to return to clinic or nearest emergency department for any change in condition, further concerns, or worsening of symptoms.    The patient demonstrated a good understanding and agreed with the treatment plan.    Isis Hayden P.A.-C.

## 2018-05-04 NOTE — LETTER
May 4, 2018        [facility]  [address]  [place] [address]        Dear Morgan:    We recently received a medication refill request form your pharmacy for your medication.  Kang Lofton has refilled this medication and has sent it to your pharmacy, he has ordered fasting labs for you to complete so we can make certain the proper dosing is correct on your medications .    Please find lab orders attached and Kang Lofton would like for you to make a follow up appointment have them done at your earliest convenience to receive further refills.     If you have any questions please feel free to contact me at [phone].          If you have any questions or concerns, please don't hesitate to call.        Sincerely,  Maame ALVAREZ   Medical Assistant       Electronically Signed

## 2018-05-15 ENCOUNTER — OFFICE VISIT (OUTPATIENT)
Dept: URGENT CARE | Facility: CLINIC | Age: 73
End: 2018-05-15
Payer: MEDICARE

## 2018-05-15 VITALS
TEMPERATURE: 98 F | HEIGHT: 66 IN | RESPIRATION RATE: 18 BRPM | WEIGHT: 208 LBS | SYSTOLIC BLOOD PRESSURE: 130 MMHG | DIASTOLIC BLOOD PRESSURE: 82 MMHG | BODY MASS INDEX: 33.43 KG/M2 | OXYGEN SATURATION: 93 % | HEART RATE: 78 BPM

## 2018-05-15 DIAGNOSIS — N39.0 COMPLICATED UTI (URINARY TRACT INFECTION): ICD-10-CM

## 2018-05-15 DIAGNOSIS — R30.0 DYSURIA: ICD-10-CM

## 2018-05-15 DIAGNOSIS — Z87.440 HISTORY OF RECURRENT UTI (URINARY TRACT INFECTION): ICD-10-CM

## 2018-05-15 LAB
APPEARANCE UR: CLEAR
BILIRUB UR STRIP-MCNC: NORMAL MG/DL
COLOR UR AUTO: YELLOW
GLUCOSE UR STRIP.AUTO-MCNC: 2000 MG/DL
KETONES UR STRIP.AUTO-MCNC: 5 MG/DL
LEUKOCYTE ESTERASE UR QL STRIP.AUTO: NORMAL
NITRITE UR QL STRIP.AUTO: NORMAL
PH UR STRIP.AUTO: 7 [PH] (ref 5–8)
PROT UR QL STRIP: NORMAL MG/DL
RBC UR QL AUTO: NORMAL
SP GR UR STRIP.AUTO: 1
UROBILINOGEN UR STRIP-MCNC: NORMAL MG/DL

## 2018-05-15 PROCEDURE — 99213 OFFICE O/P EST LOW 20 MIN: CPT | Performed by: NURSE PRACTITIONER

## 2018-05-15 PROCEDURE — 81002 URINALYSIS NONAUTO W/O SCOPE: CPT | Performed by: NURSE PRACTITIONER

## 2018-05-15 RX ORDER — NITROFURANTOIN 25; 75 MG/1; MG/1
100 CAPSULE ORAL 2 TIMES DAILY
Qty: 14 CAP | Refills: 0 | Status: SHIPPED | OUTPATIENT
Start: 2018-05-15 | End: 2018-05-22

## 2018-05-15 ASSESSMENT — ENCOUNTER SYMPTOMS
NAUSEA: 0
SHORTNESS OF BREATH: 0
CHILLS: 0
SORE THROAT: 0
VOMITING: 0
DIZZINESS: 0
EYE PAIN: 0
FEVER: 0
MYALGIAS: 0
FLANK PAIN: 1

## 2018-05-15 NOTE — PROGRESS NOTES
"  Subjective:     Morgan Solis is a 72 y.o. female who presents for Dysuria   Patient returns to clinic today for UTI. Patient was seen and treated for an acute, but can take UTI 5/4. Urine culture positive. Patient still complaining of mild left-sided flank pain, lower abdominal pressure. Patient denies any fevers, chills. Patient has had multiple recurrent UTIs and is sure her symptoms have not completely resolved. Patient leaving for [place] tomorrow.     Dysuria    This is a recurrent problem. The current episode started 1 to 4 weeks ago. The problem occurs every urination. The problem has been unchanged. The quality of the pain is described as aching. The pain is at a severity of 4/10. The pain is mild. There has been no fever. She is not sexually active. There is a history of pyelonephritis. Associated symptoms include flank pain, frequency and urgency. Pertinent negatives include no chills, hematuria, hesitancy, nausea or vomiting. She has tried antibiotics for the symptoms. The treatment provided mild relief. Her past medical history is significant for recurrent UTIs.     Past Medical History:   Diagnosis Date   • Anesthesia     \"sinus drainage, throat swelling, must be intubated by mouth, needs atropine and benadryl every time I have anesthesia\"   • Arthritis     osteoarthritis, hips and fingers   • Bronchitis 2012   • Diabetes (HCC)     oral medication   • Disorder of thyroid     hypothyroid   • Hiatus hernia syndrome     improved   • High cholesterol    • Hypertension    • Pain 6/23/17    left hip, left leg     Past Surgical History:   Procedure Laterality Date   • HIP ARTH ANTERIOR TOTAL Left 7/6/2017    Procedure: HIP ARTHROPLASTY ANTERIOR TOTAL;  Surgeon: Jason Zepeda M.D.;  Location: SURGERY [facility];  Service:    • APPENDECTOMY     • CARPAL TUNNEL RELEASE Left    • CHOLECYSTECTOMY     • HYSTERECTOMY, VAGINAL     • TONSILLECTOMY       Social History     Social History   • Marital " "status: Single     Spouse name: N/A   • Number of children: N/A   • Years of education: N/A     Occupational History   • Not on file.     Social History Main Topics   • Smoking status: Former Smoker     Packs/day: 1.00     Years: 35.00     Types: Cigarettes     Quit date: 7/6/2005   • Smokeless tobacco: Never Used      Comment: Quit in 2004   • Alcohol use 0.0 - 0.6 oz/week      Comment: 1 drink a week   • Drug use: No   • Sexual activity: Not Currently     Partners: Male     Other Topics Concern   • Not on file     Social History Narrative   • No narrative on file      Family History   Problem Relation Age of Onset   • Lung Disease Mother    • Diabetes Father      Diabetes type 1   • Heart Disease Father    • Arthritis Sister    • Thyroid Sister    • Hypertension Sister     Review of Systems   Constitutional: Negative for chills and fever.   HENT: Negative for sore throat.    Eyes: Negative for pain.   Respiratory: Negative for shortness of breath.    Cardiovascular: Negative for chest pain.   Gastrointestinal: Negative for nausea and vomiting.   Genitourinary: Positive for dysuria, flank pain, frequency and urgency. Negative for hematuria and hesitancy.   Musculoskeletal: Negative for myalgias.   Skin: Negative for rash.   Neurological: Negative for dizziness.     Allergies   Allergen Reactions   • Anesthetics, Sarah [Esters]      Throat swelling and severe nasal discharge.  Will need atropine and benadryl.   • Cephalosporins      Vomiting, red flushing, palpitations   • Codeine      Pain increases   • Keflex Vomiting     Flushing, tickling in throat, projectile vomiting, tachycardia   Tolerates penicillins   • Mobic [Meloxicam] Anxiety     Felt tingling in her extremities and anxiety.       Objective:   /82   Pulse 78   Temp 36.7 °C (98 °F)   Resp 18   Ht 1.676 m (5' 6\")   Wt 94.3 kg (208 lb)   SpO2 93%   Breastfeeding? No   BMI 33.57 kg/m²   Physical Exam   Constitutional: She is oriented to " person, place, and time. She appears well-developed and well-nourished. No distress.   HENT:   Head: Normocephalic and atraumatic.   Right Ear: Tympanic membrane normal.   Left Ear: Tympanic membrane normal.   Nose: Nose normal. Right sinus exhibits no maxillary sinus tenderness and no frontal sinus tenderness. Left sinus exhibits no maxillary sinus tenderness and no frontal sinus tenderness.   Mouth/Throat: Uvula is midline, oropharynx is clear and moist and mucous membranes are normal. No posterior oropharyngeal edema, posterior oropharyngeal erythema or tonsillar abscesses. No tonsillar exudate.   Eyes: Conjunctivae and EOM are normal. Pupils are equal, round, and reactive to light. Right eye exhibits no discharge. Left eye exhibits no discharge.   Cardiovascular: Normal rate and regular rhythm.    No murmur heard.  Pulmonary/Chest: Effort normal and breath sounds normal. No respiratory distress.   Abdominal: Soft. She exhibits no distension. There is tenderness in the suprapubic area. There is CVA tenderness (left side).   Neurological: She is alert and oriented to person, place, and time. She has normal reflexes. No sensory deficit.   Skin: Skin is warm, dry and intact.   Psychiatric: She has a normal mood and affect.         Assessment/Plan:   Assessment    1. Complicated UTI (urinary tract infection)  POCT Urinalysis    nitrofurantoin monohydr macro (MACROBID) 100 MG Cap   2. Dysuria  POCT Urinalysis    nitrofurantoin monohydr macro (MACROBID) 100 MG Cap   3. History of recurrent UTI (urinary tract infection)  POCT Urinalysis    nitrofurantoin monohydr macro (MACROBID) 100 MG Cap     Urine culture positive for Klebsilella pneumonia. Patient has tolerated Macrobid the past, kidney function adequate. Will place patient on 7 days course of Macrobid for treatment. The patient to make and appointment follow-up with urologist.     Differential diagnosis, natural history, supportive care, and indications for  immediate follow-up discussed.

## 2018-06-06 NOTE — PROGRESS NOTES
Outcome: Left Message    Please transfer to Patient Outreach Team at [phone] when patient returns call.

## 2018-07-05 DIAGNOSIS — E11.9 TYPE 2 DIABETES MELLITUS WITHOUT COMPLICATION, WITHOUT LONG-TERM CURRENT USE OF INSULIN (HCC): ICD-10-CM

## 2018-07-05 NOTE — TELEPHONE ENCOUNTER
Refill done. Patient is due for annual appointment. Please have patient schedule.  Kang Lofton M.D.

## 2018-07-05 NOTE — LETTER
July 5, 2018        Morgan Solis  [address]  [place] [address]        Dear Morgan:    This letter is to inform you the you are due for an annual appointment. Please contact our scheduling department at [phone] To schedule       If you have any questions or concerns, please don't hesitate to call.        Sincerely,        Kang Lofton M.D.    Electronically Signed

## 2018-08-24 ENCOUNTER — OFFICE VISIT (OUTPATIENT)
Dept: URGENT CARE | Facility: CLINIC | Age: 73
End: 2018-08-24
Payer: MEDICARE

## 2018-08-24 ENCOUNTER — HOSPITAL ENCOUNTER (OUTPATIENT)
Facility: MEDICAL CENTER | Age: 73
End: 2018-08-24
Attending: NURSE PRACTITIONER
Payer: MEDICARE

## 2018-08-24 VITALS
OXYGEN SATURATION: 96 % | BODY MASS INDEX: 33.11 KG/M2 | WEIGHT: 206 LBS | DIASTOLIC BLOOD PRESSURE: 70 MMHG | TEMPERATURE: 97.7 F | SYSTOLIC BLOOD PRESSURE: 120 MMHG | RESPIRATION RATE: 16 BRPM | HEART RATE: 84 BPM | HEIGHT: 66 IN

## 2018-08-24 DIAGNOSIS — N39.0 URINARY TRACT INFECTION WITH HEMATURIA, SITE UNSPECIFIED: ICD-10-CM

## 2018-08-24 DIAGNOSIS — R31.9 URINARY TRACT INFECTION WITH HEMATURIA, SITE UNSPECIFIED: ICD-10-CM

## 2018-08-24 DIAGNOSIS — R35.0 URINARY FREQUENCY: ICD-10-CM

## 2018-08-24 LAB
APPEARANCE UR: NORMAL
BILIRUB UR STRIP-MCNC: NORMAL MG/DL
COLOR UR AUTO: NORMAL
GLUCOSE UR STRIP.AUTO-MCNC: NORMAL MG/DL
KETONES UR STRIP.AUTO-MCNC: NORMAL MG/DL
LEUKOCYTE ESTERASE UR QL STRIP.AUTO: NORMAL
NITRITE UR QL STRIP.AUTO: NORMAL
PH UR STRIP.AUTO: 5 [PH] (ref 5–8)
PROT UR QL STRIP: 30 MG/DL
RBC UR QL AUTO: NORMAL
SP GR UR STRIP.AUTO: 1.01
UROBILINOGEN UR STRIP-MCNC: NORMAL MG/DL

## 2018-08-24 PROCEDURE — 87086 URINE CULTURE/COLONY COUNT: CPT

## 2018-08-24 PROCEDURE — 87186 SC STD MICRODIL/AGAR DIL: CPT

## 2018-08-24 PROCEDURE — 87077 CULTURE AEROBIC IDENTIFY: CPT

## 2018-08-24 PROCEDURE — 81002 URINALYSIS NONAUTO W/O SCOPE: CPT | Performed by: NURSE PRACTITIONER

## 2018-08-24 PROCEDURE — 99214 OFFICE O/P EST MOD 30 MIN: CPT | Performed by: NURSE PRACTITIONER

## 2018-08-24 RX ORDER — CIPROFLOXACIN 500 MG/1
500 TABLET, FILM COATED ORAL 2 TIMES DAILY
Qty: 14 TAB | Refills: 0 | Status: SHIPPED | OUTPATIENT
Start: 2018-08-24 | End: 2019-01-24

## 2018-08-24 ASSESSMENT — ENCOUNTER SYMPTOMS
ORTHOPNEA: 0
BACK PAIN: 1
ABDOMINAL PAIN: 0
HEADACHES: 0
FEVER: 0
FLANK PAIN: 1
CHILLS: 0
VOMITING: 0
DIZZINESS: 0
DIARRHEA: 0
NAUSEA: 0

## 2018-08-24 NOTE — PROGRESS NOTES
Subjective:      Morgan Solis is a 72 y.o. female who presents with Dysuria (burning, frequency, flank pain x 1 week ago )            HPI New problem. 72 year old female with one week history of urinary frequency and flank pain as well as burning with urination. She denies fever, chills, diarrhea, blood in urine. She has had some nausea. Taking cranberry pills and pushing fluids as well as taking azo.  Anesthetics, warner [esters]; Cephalosporins; Codeine; Keflex; and Mobic [meloxicam]  Current Outpatient Prescriptions on File Prior to Visit   Medication Sig Dispense Refill   • metFORMIN (GLUCOPHAGE) 500 MG Tab TAKE ONE TABLET BY MOUTH TWICE DAILY WITH MEALS 180 Tab 0   • atorvastatin (LIPITOR) 20 MG Tab TAKE ONE TABLET BY MOUTH AT BEDTIME 90 Tab 0   • glipiZIDE (GLUCOTROL) 5 MG Tab TAKE ONE TABLET BY MOUTH TWICE DAILY 180 Tab 0   • aspirin EC 81 MG EC tablet Take 1 Tab by mouth 2 times a day. 90 Tab 0   • Multiple Vitamins-Minerals (MULTIVITAMIN ADULT PO) Take 1 tablet by mouth every bedtime.     • Krill Oil 500 MG Cap Take 1 Cap by mouth every bedtime.     • ASCENSIA MICROFILL TEST strip USE  STRIP TO CHECK GLUCOSE ONCE DAILY 100 Strip 0   • tramadol (ULTRAM) 50 MG Tab TAKE ONE TABLET BY MOUTH TWICE DAILY AS NEEDED FOR  SEVERE  PAIN 120 Tab 2   • azelastine (ASTELIN) 137 MCG/SPRAY nasal spray Freedom 1 Spray in nose 2 times a day. 30 mL 2   • acetaminophen (TYLENOL) 500 MG Tab Take 500 mg by mouth Pre-Op Once.     • gabapentin (NEURONTIN) 600 MG tablet Take 600 mg by mouth Pre-Op Once.     • celecoxib (CELEBREX) 200 MG Cap Take 200 mg by mouth Pre-Op Once.     • diclofenac EC (VOLTAREN) 75 MG Tablet Delayed Response Take 75 mg by mouth 2 times a day.     • lisinopril (PRINIVIL) 10 MG Tab Take 10 mg by mouth 1 time daily as needed (Pt only takes if blood pressure is 135/85 or higher.).     • aspirin EC (ECOTRIN) 81 MG Tablet Delayed Response Take 81 mg by mouth every morning.     • Biotin 5000 MCG Cap Take 1  "Capsule by mouth every bedtime.     • Turmeric 500 MG Tab Take 1 tablet by mouth every bedtime.     • levothyroxine (SYNTHROID) 50 MCG Tab TAKE ONE TABLET BY MOUTH ONCE DAILY IN THE MORNING ON EMPTY STOMACH 90 Tab 0   • tramadol (ULTRAM) 50 MG Tab Take 1 Tab by mouth 2 times a day as needed for Severe Pain. 120 Tab 2     No current facility-administered medications on file prior to visit.      Social History     Social History   • Marital status: Single     Spouse name: N/A   • Number of children: N/A   • Years of education: N/A     Occupational History   • Not on file.     Social History Main Topics   • Smoking status: Former Smoker     Packs/day: 1.00     Years: 35.00     Types: Cigarettes     Quit date: 7/6/2005   • Smokeless tobacco: Never Used      Comment: Quit in 2004   • Alcohol use 0.0 - 0.6 oz/week      Comment: 1 drink a week   • Drug use: No   • Sexual activity: Not Currently     Partners: Male     Other Topics Concern   • Not on file     Social History Narrative   • No narrative on file     family history includes Arthritis in her sister; Diabetes in her father; Heart Disease in her father; Hypertension in her sister; Lung Disease in her mother; Thyroid in her sister.      Review of Systems   Constitutional: Negative for chills and fever.   Cardiovascular: Negative for chest pain and orthopnea.   Gastrointestinal: Negative for abdominal pain, diarrhea, nausea and vomiting.   Genitourinary: Positive for dysuria, flank pain, frequency and urgency. Negative for hematuria.   Musculoskeletal: Positive for back pain.   Neurological: Negative for dizziness and headaches.          Objective:     /70   Pulse 84   Temp 36.5 °C (97.7 °F)   Resp 16   Ht 1.676 m (5' 6\")   Wt 93.4 kg (206 lb)   SpO2 96%   Breastfeeding? No   BMI 33.25 kg/m²      Physical Exam   Constitutional: She is oriented to person, place, and time. She appears well-developed and well-nourished. No distress.   Cardiovascular: Normal " rate, regular rhythm and normal heart sounds.    No murmur heard.  Pulmonary/Chest: Effort normal and breath sounds normal. No respiratory distress.   Abdominal: Soft. Bowel sounds are normal. There is tenderness in the suprapubic area. There is CVA tenderness.   Musculoskeletal: Normal range of motion.   Moves all 4 extremities normally   Neurological: She is alert and oriented to person, place, and time.   Skin: Skin is warm and dry.   Psychiatric: She has a normal mood and affect. Her behavior is normal. Thought content normal.   Nursing note and vitals reviewed.              Assessment/Plan:     1. Urinary tract infection with hematuria, site unspecified  ciprofloxacin (CIPRO) 500 MG Tab    URINE CULTURE(NEW)   2. Urinary frequency  POCT Urinalysis     Differential diagnosis, natural history, supportive care, and indications for immediate follow-up discussed at length.

## 2018-09-12 ENCOUNTER — OFFICE VISIT (OUTPATIENT)
Dept: URGENT CARE | Facility: CLINIC | Age: 73
End: 2018-09-12
Payer: MEDICARE

## 2018-09-12 VITALS
RESPIRATION RATE: 16 BRPM | WEIGHT: 205 LBS | HEART RATE: 88 BPM | BODY MASS INDEX: 32.95 KG/M2 | SYSTOLIC BLOOD PRESSURE: 100 MMHG | OXYGEN SATURATION: 94 % | DIASTOLIC BLOOD PRESSURE: 70 MMHG | HEIGHT: 66 IN | TEMPERATURE: 96.8 F

## 2018-09-12 DIAGNOSIS — H61.23 BILATERAL IMPACTED CERUMEN: ICD-10-CM

## 2018-09-12 DIAGNOSIS — J01.10 ACUTE FRONTAL SINUSITIS, RECURRENCE NOT SPECIFIED: ICD-10-CM

## 2018-09-12 PROCEDURE — 69210 REMOVE IMPACTED EAR WAX UNI: CPT | Performed by: NURSE PRACTITIONER

## 2018-09-12 PROCEDURE — 99214 OFFICE O/P EST MOD 30 MIN: CPT | Mod: 25 | Performed by: NURSE PRACTITIONER

## 2018-09-12 RX ORDER — AMOXICILLIN AND CLAVULANATE POTASSIUM 875; 125 MG/1; MG/1
1 TABLET, FILM COATED ORAL 2 TIMES DAILY
Qty: 20 TAB | Refills: 0 | Status: SHIPPED | OUTPATIENT
Start: 2018-09-12 | End: 2018-09-22

## 2018-09-12 ASSESSMENT — ENCOUNTER SYMPTOMS
FEVER: 0
HOARSE VOICE: 1
COUGH: 1
HEADACHES: 1
SINUS PAIN: 1
SINUS PRESSURE: 1

## 2018-09-12 NOTE — PROGRESS NOTES
"Subjective:      Morgan Solis is a 72 y.o. female who presents with Pharyngitis (x2 days, also Ear pain both ears x3wks., sinus pressure, dizziness, body aches.)            Sinusitis   This is a new problem. Episode onset: pt reports for the last 3 weeks she has been suffering from frontal sinus congestion, drainage and fatigue. She admits recently that her joints have started to ache and she is tired of feeling sick. Denies any recent fever. The problem is unchanged. Associated symptoms include congestion, coughing (dry), headaches, a hoarse voice and sinus pressure. (She also reports both her ears are hurting and feels increased pressure) Past treatments include acetaminophen (rest). The treatment provided no relief.       Review of Systems   Constitutional: Positive for malaise/fatigue. Negative for fever.   HENT: Positive for congestion, hoarse voice, sinus pain and sinus pressure.    Respiratory: Positive for cough (dry).    Neurological: Positive for headaches.   All other systems reviewed and are negative.    Past Medical History:   Diagnosis Date   • Anesthesia     \"sinus drainage, throat swelling, must be intubated by mouth, needs atropine and benadryl every time I have anesthesia\"   • Arthritis     osteoarthritis, hips and fingers   • Bronchitis 2012   • Diabetes (HCC)     oral medication   • Disorder of thyroid     hypothyroid   • Hiatus hernia syndrome     improved   • High cholesterol    • Hypertension    • Pain 6/23/17    left hip, left leg      Past Surgical History:   Procedure Laterality Date   • HIP ARTH ANTERIOR TOTAL Left 7/6/2017    Procedure: HIP ARTHROPLASTY ANTERIOR TOTAL;  Surgeon: Jason Zepeda M.D.;  Location: SURGERY [facility];  Service:    • APPENDECTOMY     • CARPAL TUNNEL RELEASE Left    • CHOLECYSTECTOMY     • HYSTERECTOMY, VAGINAL     • TONSILLECTOMY        Social History     Social History   • Marital status: Single     Spouse name: N/A   • Number of children: N/A " "  • Years of education: N/A     Occupational History   • Not on file.     Social History Main Topics   • Smoking status: Former Smoker     Packs/day: 1.00     Years: 35.00     Types: Cigarettes     Quit date: 7/6/2005   • Smokeless tobacco: Never Used      Comment: Quit in 2004   • Alcohol use 0.0 - 0.6 oz/week      Comment: 1 drink a week   • Drug use: No   • Sexual activity: Not Currently     Partners: Male     Other Topics Concern   • Not on file     Social History Narrative   • No narrative on file          Objective:     /70   Pulse 88   Temp 36 °C (96.8 °F)   Resp 16   Ht 1.676 m (5' 6\")   Wt 93 kg (205 lb)   SpO2 94%   Breastfeeding? No   BMI 33.09 kg/m²      Physical Exam   Constitutional: She is oriented to person, place, and time. Vital signs are normal. She appears well-developed and well-nourished.   HENT:   Head: Normocephalic and atraumatic.   Right Ear: External ear normal.   Left Ear: External ear normal.   Nose: Mucosal edema, rhinorrhea and sinus tenderness present. Right sinus exhibits frontal sinus tenderness. Left sinus exhibits frontal sinus tenderness.   Bilateral cerumen impaction   Eyes: Pupils are equal, round, and reactive to light. EOM are normal.   Neck: Normal range of motion.   Cardiovascular: Normal rate and regular rhythm.    Pulmonary/Chest: Effort normal.   Musculoskeletal: Normal range of motion.   Lymphadenopathy:        Head (right side): Submandibular adenopathy present.        Head (left side): Submandibular adenopathy present.   Neurological: She is alert and oriented to person, place, and time.   Skin: Skin is warm and dry. Capillary refill takes less than 2 seconds.   Psychiatric: She has a normal mood and affect. Her speech is normal and behavior is normal. Thought content normal.   Vitals reviewed.         Procedure: Cerumen Removal  Risks and benefits of procedure discussed  Cerumen removed with curette and lavage after softening agent instilled  Patient " tolerated well  Post procedure exam with clear canal and normal TM       Assessment/Plan:     1. Acute frontal sinusitis, recurrence not specified  - amoxicillin-clavulanate (AUGMENTIN) 875-125 MG Tab; Take 1 Tab by mouth 2 times a day for 10 days.  Dispense: 20 Tab; Refill: 0    2. Bilateral impacted cerumen    Pt reports improved pain and pressure with cerumen removal   TMs are clear and there does not appear to be an infection  OTC sudafed as directed  Increase water intake  Alternate tylenol and ibuprofen as needed for headaches or pain  Get plenty of rest  Supportive care, differential diagnoses, and indications for immediate follow-up discussed with patient.    Pathogenesis of diagnosis discussed including typical length and natural progression.      Instructed to return to  or nearest emergency department if symptoms fail to improve, for any change in condition, further concerns, or new concerning symptoms.  Patient states understanding of the plan of care and discharge instructions.

## 2018-09-27 DIAGNOSIS — E78.00 PURE HYPERCHOLESTEROLEMIA: ICD-10-CM

## 2018-09-27 RX ORDER — LISINOPRIL 10 MG/1
TABLET ORAL
Qty: 90 TAB | Refills: 0 | Status: SHIPPED | OUTPATIENT
Start: 2018-09-27 | End: 2019-01-24

## 2018-09-27 RX ORDER — ATORVASTATIN CALCIUM 20 MG/1
TABLET, FILM COATED ORAL
Qty: 90 TAB | Refills: 0 | Status: SHIPPED | OUTPATIENT
Start: 2018-09-27 | End: 2019-02-04 | Stop reason: SDUPTHER

## 2018-10-17 ENCOUNTER — PATIENT OUTREACH (OUTPATIENT)
Dept: HEALTH INFORMATION MANAGEMENT | Facility: OTHER | Age: 73
End: 2018-10-17

## 2018-10-17 NOTE — PROGRESS NOTES
Attempt #:Final    WebIZ Checked & Epic Updated: yes  HealthConnect Verified: yes  Verify PCP: yes    Communication Preference Obtained: no    Annual Wellness Visit Scheduling  1. Scheduling Status:Not scheduled/ Pt stated that she will call at later time to schedule/ Do not call.       Care Gap Scheduling (Attempt to Schedule EACH Overdue Care Gap!) / Not able to address care gaps.  Health Maintenance Due   Topic Date Due   • RETINAL SCREENING  11/26/1965   • URINE ACR / MICROALBUMIN  11/26/1965   • IMM HEP B VACCINE (1 of 3 - Risk 3-dose series) 11/26/1966   • IMM DTaP/Tdap/Td Vaccine (1 - Tdap) 11/26/1966   • MAMMOGRAM  11/26/1987   • COLONOSCOPY  11/26/1997   • IMM ZOSTER VACCINES (1 of 2) 11/26/1997   • BONE DENSITY  11/26/2012   • A1C SCREENING  02/11/2016   • FASTING LIPID PROFILE  08/11/2016   • DIABETES MONOFILAMENT / LE EXAM  03/28/2018   • IMM PNEUMOCOCCAL 65+ (ADULT) LOW/MEDIUM RISK SERIES (2 of 2 - PPSV23) 06/07/2018   • SERUM CREATININE  06/23/2018   • IMM INFLUENZA (1) 09/01/2018       MyChart Activation: already active

## 2018-10-26 DIAGNOSIS — E11.9 TYPE 2 DIABETES MELLITUS WITHOUT COMPLICATION, WITHOUT LONG-TERM CURRENT USE OF INSULIN (HCC): ICD-10-CM

## 2018-10-26 NOTE — LETTER
October 29, 2018        Morgan Solis  [address]  [place] [address]        Dear Morgan:    This letter is to inform you the you are due for an annual appointment. Please contact our scheduling department at [phone] To schedule       If you have any questions or concerns, please don't hesitate to call.        Sincerely,        Kang Lofton M.D.    Electronically Signed

## 2018-10-29 NOTE — TELEPHONE ENCOUNTER
From: Morgan Solis  Sent: 10/26/2018 12:39 PM PDT  Subject: Medication Renewal Request    Morgan Solis would like a refill of the following medications:     ASCENSIA MICROFILL TEST strip [Kang Lofton M.D.]    Preferred pharmacy: [facility] - [place] - [address]

## 2018-11-16 ENCOUNTER — PATIENT OUTREACH (OUTPATIENT)
Dept: HEALTH INFORMATION MANAGEMENT | Facility: OTHER | Age: 73
End: 2018-11-16

## 2018-11-17 NOTE — PROGRESS NOTES
Outcome: Left Message    Please transfer to Patient Outreach Team at [phone] when patient returns call.        Attempt # 1

## 2018-12-20 NOTE — PROGRESS NOTES
Outcome:  Morgan, stated that has been a couple yrs since she had a mammogram and a Colonoscopy , and that she has a Kit to complete a Fit Test at home. Pt stated that she is going to [place] to spend the holidays and doesn't know when is going to return. So, she will call later on to schedule care gaps.     Attempt # Final

## 2019-01-03 ENCOUNTER — OFFICE VISIT (OUTPATIENT)
Dept: URGENT CARE | Facility: CLINIC | Age: 74
End: 2019-01-03
Payer: MEDICARE

## 2019-01-03 ENCOUNTER — APPOINTMENT (OUTPATIENT)
Dept: RADIOLOGY | Facility: IMAGING CENTER | Age: 74
End: 2019-01-03
Attending: PHYSICIAN ASSISTANT
Payer: MEDICARE

## 2019-01-03 VITALS
HEIGHT: 66 IN | TEMPERATURE: 97 F | SYSTOLIC BLOOD PRESSURE: 122 MMHG | HEART RATE: 91 BPM | WEIGHT: 209 LBS | DIASTOLIC BLOOD PRESSURE: 70 MMHG | BODY MASS INDEX: 33.59 KG/M2 | OXYGEN SATURATION: 91 %

## 2019-01-03 DIAGNOSIS — R05.9 COUGH: ICD-10-CM

## 2019-01-03 DIAGNOSIS — J40 BRONCHITIS: ICD-10-CM

## 2019-01-03 DIAGNOSIS — J01.00 ACUTE MAXILLARY SINUSITIS, RECURRENCE NOT SPECIFIED: ICD-10-CM

## 2019-01-03 PROCEDURE — 99214 OFFICE O/P EST MOD 30 MIN: CPT | Performed by: PHYSICIAN ASSISTANT

## 2019-01-03 PROCEDURE — 71046 X-RAY EXAM CHEST 2 VIEWS: CPT | Mod: 26 | Performed by: PHYSICIAN ASSISTANT

## 2019-01-03 RX ORDER — BENZONATATE 200 MG/1
200 CAPSULE ORAL 3 TIMES DAILY PRN
Qty: 30 CAP | Refills: 0 | Status: SHIPPED | OUTPATIENT
Start: 2019-01-03 | End: 2019-01-24

## 2019-01-03 RX ORDER — DOXYCYCLINE HYCLATE 100 MG
100 TABLET ORAL 2 TIMES DAILY
Qty: 14 TAB | Refills: 0 | Status: SHIPPED | OUTPATIENT
Start: 2019-01-03 | End: 2019-01-10

## 2019-01-03 ASSESSMENT — ENCOUNTER SYMPTOMS
CHILLS: 1
SORE THROAT: 1
ABDOMINAL PAIN: 0
MYALGIAS: 1
RHINORRHEA: 1
FEVER: 1
SENSORY CHANGE: 0
SINUS PAIN: 1
PALPITATIONS: 0
TINGLING: 0
FOCAL WEAKNESS: 0
COUGH: 1
DIARRHEA: 0
SWEATS: 0
SPUTUM PRODUCTION: 1
WHEEZING: 0
HEADACHES: 1
SHORTNESS OF BREATH: 1
HEMOPTYSIS: 0
NAUSEA: 0
VOMITING: 0

## 2019-01-03 ASSESSMENT — PATIENT HEALTH QUESTIONNAIRE - PHQ9: CLINICAL INTERPRETATION OF PHQ2 SCORE: 0

## 2019-01-03 NOTE — PROGRESS NOTES
"Subjective:      Morgan Solis is a 73 y.o. female who presents with Cough (x1week ); Pharyngitis; Nasal Congestion; and Headache            Cough   This is a new problem. Episode onset: 1 week  The problem has been gradually worsening. The cough is productive of sputum (mckee sputum ). Associated symptoms include chills, ear pain (bilaterally ), a fever (101F 2 days ago), headaches, myalgias, nasal congestion, rhinorrhea, a sore throat and shortness of breath. Pertinent negatives include no chest pain, ear congestion, hemoptysis, postnasal drip, sweats or wheezing. Associated symptoms comments: Sinus pressure, headache, . She has tried OTC cough suppressant (Mucinex, Throat spray ) for the symptoms. The treatment provided mild relief. Her past medical history is significant for pneumonia (\"long time ago\" ). There is no history of asthma or bronchitis.       Past Medical History:   Diagnosis Date   • Anesthesia     \"sinus drainage, throat swelling, must be intubated by mouth, needs atropine and benadryl every time I have anesthesia\"   • Arthritis     osteoarthritis, hips and fingers   • Bronchitis 2012   • Diabetes (HCC)     oral medication   • Disorder of thyroid     hypothyroid   • Hiatus hernia syndrome     improved   • High cholesterol    • Hypertension    • Pain 6/23/17    left hip, left leg       Past Surgical History:   Procedure Laterality Date   • HIP ARTH ANTERIOR TOTAL Left 7/6/2017    Procedure: HIP ARTHROPLASTY ANTERIOR TOTAL;  Surgeon: Jason Zepeda M.D.;  Location: SURGERY [facility];  Service:    • APPENDECTOMY     • CARPAL TUNNEL RELEASE Left    • CHOLECYSTECTOMY     • HYSTERECTOMY, VAGINAL     • TONSILLECTOMY         Family History   Problem Relation Age of Onset   • Lung Disease Mother    • Diabetes Father         Diabetes type 1   • Heart Disease Father    • Arthritis Sister    • Thyroid Sister    • Hypertension Sister        Allergies   Allergen Reactions   • Amoxicillin Hives   • " "Anesthetics, Sarah [Esters]      Throat swelling and severe nasal discharge.  Will need atropine and benadryl.   • Cephalosporins      Vomiting, red flushing, palpitations   • Codeine      Pain increases   • Keflex Vomiting     Flushing, tickling in throat, projectile vomiting, tachycardia   Tolerates penicillins   • Mobic [Meloxicam] Anxiety     Felt tingling in her extremities and anxiety.        Medications, Allergies, and current problem list reviewed today in Epic      Review of Systems   Constitutional: Positive for chills, fever (101F 2 days ago) and malaise/fatigue.   HENT: Positive for congestion, ear pain (bilaterally ), rhinorrhea, sinus pain and sore throat. Negative for ear discharge and postnasal drip.    Respiratory: Positive for cough, sputum production and shortness of breath. Negative for hemoptysis and wheezing.    Cardiovascular: Negative for chest pain, palpitations and leg swelling.   Gastrointestinal: Negative for abdominal pain, diarrhea, nausea and vomiting.   Musculoskeletal: Positive for myalgias.   Neurological: Positive for headaches. Negative for tingling, sensory change and focal weakness.     All other systems reviewed and are negative.          Objective:     /70 (BP Location: Right arm, Patient Position: Sitting, BP Cuff Size: Adult)   Pulse 91   Temp 36.1 °C (97 °F) (Temporal)   Ht 1.676 m (5' 6\")   Wt 94.8 kg (209 lb)   SpO2 91%   BMI 33.73 kg/m²      Physical Exam   Constitutional: She is oriented to person, place, and time. She appears well-developed and well-nourished. No distress.   HENT:   Head: Normocephalic and atraumatic.   Right Ear: Tympanic membrane, external ear and ear canal normal.   Left Ear: Tympanic membrane, external ear and ear canal normal.   Nose: Mucosal edema and rhinorrhea present. Right sinus exhibits maxillary sinus tenderness. Left sinus exhibits maxillary sinus tenderness.   Mouth/Throat: Uvula is midline, oropharynx is clear and moist and " mucous membranes are normal.   Eyes: Conjunctivae are normal.   Neck: Neck supple.   Cardiovascular: Normal rate, regular rhythm and normal heart sounds.  Exam reveals no gallop and no friction rub.    No murmur heard.  Pulmonary/Chest: Effort normal. No respiratory distress. She has no decreased breath sounds. She has no wheezes. She has rhonchi (upper lung fields bilaterally ). She has no rales.   Lymphadenopathy:     She has no cervical adenopathy.   Neurological: She is alert and oriented to person, place, and time. No cranial nerve deficit.   Skin: Skin is warm and dry. No rash noted.   Psychiatric: She has a normal mood and affect. Her behavior is normal. Judgment and thought content normal.          1/3/2019 11:55 AM    HISTORY/REASON FOR EXAM:  Cough      TECHNIQUE/EXAM DESCRIPTION AND NUMBER OF VIEWS:  Two views of the chest.    COMPARISON:  None.    FINDINGS:    The cardiac silhouette  and mediastinal contours are normal.    No discrete opacity, pleural fluid or pneumothorax.    Degenerative changes are in the spine.       Impression       No acute cardiopulmonary findings.          Assessment/Plan:     1. Acute maxillary sinusitis, recurrence not specified  DX-CHEST-2 VIEWS    doxycycline (VIBRAMYCIN) 100 MG Tab    benzonatate (TESSALON) 200 MG capsule   2. Bronchitis  doxycycline (VIBRAMYCIN) 100 MG Tab    benzonatate (TESSALON) 200 MG capsule       Current Outpatient Prescriptions:   •  doxycycline (VIBRAMYCIN) 100 MG Tab, Take 1 Tab by mouth 2 times a day for 7 days., Disp: 14 Tab, Rfl: 0    •  benzonatate (TESSALON) 200 MG capsule, Take 1 Cap by mouth 3 times a day as needed., Disp: 30 Cap, Rfl: 0         Differential diagnoses, Supportive care, and indications for immediate follow-up discussed with patient.   Instructed to return to clinic or nearest emergency department for any change in condition, further concerns, or worsening of symptoms.    The patient demonstrated a good understanding and  agreed with the treatment plan.    Isis Hayden P.A.-C.

## 2019-01-21 ENCOUNTER — PATIENT OUTREACH (OUTPATIENT)
Dept: HEALTH INFORMATION MANAGEMENT | Facility: OTHER | Age: 74
End: 2019-01-21

## 2019-01-21 NOTE — PROGRESS NOTES
Outcome: Left Message    Please transfer to Patient Outreach Team at [phone] when patient returns call.    WebIZ Checked & Epic Updated:  yes    HealthConnect Verified: yes    Attempt # 3rd and final attempt for qip. Pt overdue for care gap closures and a fv with pcp

## 2019-01-24 ENCOUNTER — OFFICE VISIT (OUTPATIENT)
Dept: URGENT CARE | Facility: CLINIC | Age: 74
End: 2019-01-24
Payer: MEDICARE

## 2019-01-24 VITALS
TEMPERATURE: 98.2 F | HEART RATE: 78 BPM | WEIGHT: 209 LBS | HEIGHT: 66 IN | BODY MASS INDEX: 33.59 KG/M2 | OXYGEN SATURATION: 99 % | RESPIRATION RATE: 16 BRPM | SYSTOLIC BLOOD PRESSURE: 124 MMHG | DIASTOLIC BLOOD PRESSURE: 78 MMHG

## 2019-01-24 DIAGNOSIS — J06.9 URI WITH COUGH AND CONGESTION: ICD-10-CM

## 2019-01-24 DIAGNOSIS — J40 BRONCHITIS: Primary | ICD-10-CM

## 2019-01-24 PROCEDURE — 99214 OFFICE O/P EST MOD 30 MIN: CPT | Performed by: PHYSICIAN ASSISTANT

## 2019-01-24 RX ORDER — MOXIFLOXACIN HYDROCHLORIDE 400 MG/1
400 TABLET ORAL DAILY
Qty: 10 TAB | Refills: 0 | Status: SHIPPED | OUTPATIENT
Start: 2019-01-24 | End: 2019-02-03

## 2019-01-24 RX ORDER — METHYLPREDNISOLONE 4 MG/1
TABLET ORAL
Qty: 21 TAB | Refills: 0 | Status: SHIPPED | OUTPATIENT
Start: 2019-01-24 | End: 2019-06-04

## 2019-01-24 RX ORDER — ALBUTEROL SULFATE 90 UG/1
2 AEROSOL, METERED RESPIRATORY (INHALATION) EVERY 6 HOURS PRN
Qty: 8.5 G | Refills: 0 | Status: SHIPPED | OUTPATIENT
Start: 2019-01-24 | End: 2019-02-03

## 2019-01-24 NOTE — PROGRESS NOTES
"Subjective:      Pt is a 73 y.o. female who presents with Cough (sinus pressure and congestion, cough, chest congestion. Symptoms not going away. )            HPI  This is a new problem. PT presents to  clinic today complaining of sore throat, fevers, pressure in ears, cough, fatigue, runny nose, wheezing and SOB. PT denies CP, NVD, abdominal pain, joint pain. PT states these symptoms began around 3 weeks ago. PT states the pain is a 4/10 with coughing fits, aching in nature and worse at night. Pt has taken doxycycline and tessalon with little improvement. The pt's medication list, problem list, and allergies have been evaluated and reviewed during today's visit.    PMH:  Past Medical History:   Diagnosis Date   • Anesthesia     \"sinus drainage, throat swelling, must be intubated by mouth, needs atropine and benadryl every time I have anesthesia\"   • Arthritis     osteoarthritis, hips and fingers   • Bronchitis    • Diabetes (HCC)     oral medication   • Disorder of thyroid     hypothyroid   • Hiatus hernia syndrome     improved   • High cholesterol    • Hypertension    • Pain 17    left hip, left leg       PSH:  Past Surgical History:   Procedure Laterality Date   • HIP ARTH ANTERIOR TOTAL Left 2017    Procedure: HIP ARTHROPLASTY ANTERIOR TOTAL;  Surgeon: Jason Zepeda M.D.;  Location: SURGERY [facility];  Service:    • APPENDECTOMY     • CARPAL TUNNEL RELEASE Left    • CHOLECYSTECTOMY     • HYSTERECTOMY, VAGINAL     • TONSILLECTOMY         Fam Hx:    family history includes Arthritis in her sister; Diabetes in her father; Heart Disease in her father; Hypertension in her sister; Lung Disease in her mother; Thyroid in her sister.  Family Status   Relation Status   • Mo    • Fa  at age 54   • Sis Alive   • Alma Alive   • Alma Alive   • Alma Alive       Soc HX:  Social History     Social History   • Marital status: Single     Spouse name: N/A   • Number of children: N/A   • Years of " education: N/A     Occupational History   • Not on file.     Social History Main Topics   • Smoking status: Former Smoker     Packs/day: 1.00     Years: 35.00     Types: Cigarettes     Quit date: 7/6/2005   • Smokeless tobacco: Never Used      Comment: Quit in 2004   • Alcohol use 0.0 - 0.6 oz/week      Comment: 1 drink a week   • Drug use: No   • Sexual activity: Not Currently     Partners: Male     Other Topics Concern   • Not on file     Social History Narrative   • No narrative on file         Medications:    Current Outpatient Prescriptions:   •  albuterol 108 (90 Base) MCG/ACT Aero Soln inhalation aerosol, Inhale 2 Puffs by mouth every 6 hours as needed for Shortness of Breath for up to 10 days., Disp: 8.5 g, Rfl: 0  •  MethylPREDNISolone (MEDROL DOSEPAK) 4 MG Tablet Therapy Pack, Use as directed, Disp: 21 Tab, Rfl: 0  •  moxifloxacin (AVELOX) 400 MG Tab, Take 1 Tab by mouth every day for 10 days., Disp: 10 Tab, Rfl: 0  •  glucose blood (ASCENSIA MICROFILL TEST) strip, 1 Strip by Other route every day., Disp: 100 Strip, Rfl: 0  •  atorvastatin (LIPITOR) 20 MG Tab, TAKE 1 TABLET BY MOUTH AT BEDTIME, Disp: 90 Tab, Rfl: 0  •  metFORMIN (GLUCOPHAGE) 500 MG Tab, TAKE ONE TABLET BY MOUTH TWICE DAILY WITH MEALS, Disp: 180 Tab, Rfl: 0  •  glipiZIDE (GLUCOTROL) 5 MG Tab, TAKE ONE TABLET BY MOUTH TWICE DAILY, Disp: 180 Tab, Rfl: 0  •  aspirin EC 81 MG EC tablet, Take 1 Tab by mouth 2 times a day., Disp: 90 Tab, Rfl: 0  •  Multiple Vitamins-Minerals (MULTIVITAMIN ADULT PO), Take 1 tablet by mouth every bedtime., Disp: , Rfl:   •  Krill Oil 500 MG Cap, Take 1 Cap by mouth every bedtime., Disp: , Rfl:   •  Biotin 5000 MCG Cap, Take 1 Capsule by mouth every bedtime., Disp: , Rfl:   •  Turmeric 500 MG Tab, Take 1 tablet by mouth every bedtime., Disp: , Rfl:   •  azelastine (ASTELIN) 137 MCG/SPRAY nasal spray, Spray 1 Spray in nose 2 times a day., Disp: 30 mL, Rfl: 2  •  acetaminophen (TYLENOL) 500 MG Tab, Take 500 mg by  "mouth Pre-Op Once., Disp: , Rfl:   •  gabapentin (NEURONTIN) 600 MG tablet, Take 600 mg by mouth Pre-Op Once., Disp: , Rfl:   •  celecoxib (CELEBREX) 200 MG Cap, Take 200 mg by mouth Pre-Op Once., Disp: , Rfl:   •  diclofenac EC (VOLTAREN) 75 MG Tablet Delayed Response, Take 75 mg by mouth 2 times a day., Disp: , Rfl:   •  aspirin EC (ECOTRIN) 81 MG Tablet Delayed Response, Take 81 mg by mouth every morning., Disp: , Rfl:       Allergies:  Amoxicillin; Anesthetics, warner [esters]; Cephalosporins; Codeine; Keflex; and Mobic [meloxicam]    ROS  Review of Systems   Constitutional: Positive for malaise/fatigue. Negative for fever and diaphoresis.   HENT: Positive for congestion and sore throat. Negative for ear discharge, hearing loss, nosebleeds and tinnitus.    Eyes: Negative for blurred vision, double vision and photophobia.   Respiratory: Positive for cough, sputum production, shortness of breath and wheezing. Negative for hemoptysis.    Cardiovascular: Negative for chest pain and palpitations.   Gastrointestinal: Negative for nausea, vomiting, abdominal pain, diarrhea and constipation.   Genitourinary: Negative for dysuria and flank pain.   Musculoskeletal: Negative for joint pain and myalgias.   Skin: Negative for itching and rash.   Neurological:  Negative for dizziness, tingling and weakness.   Endo/Heme/Allergies: Does not bruise/bleed easily.   Psychiatric/Behavioral: Negative for depression. The patient is not nervous/anxious.             Objective:     /78   Pulse 78   Temp 36.8 °C (98.2 °F) (Temporal)   Resp 16   Ht 1.676 m (5' 6\")   Wt 94.8 kg (209 lb)   SpO2 99%   BMI 33.73 kg/m²      Physical Exam      Physical Exam   Constitutional: PT is oriented to person, place, and time. PT appears well-developed and well-nourished. No distress.   HENT:   Head: Normocephalic and atraumatic.   Right Ear: Hearing, tympanic membrane, external ear and ear canal normal.   Left Ear: Hearing, tympanic " membrane, external ear and ear canal normal.   Nose: Mucosal edema, rhinorrhea and sinus tenderness present. Right sinus exhibits frontal sinus tenderness. Left sinus exhibits frontal sinus tenderness.   Mouth/Throat: Uvula is midline. Mucous membranes are pale. Posterior oropharyngeal edema and posterior oropharyngeal erythema present. No oropharyngeal exudate.   Eyes: Conjunctivae normal and EOM are normal. Pupils are equal, round, and reactive to light. Right eye exhibits no discharge. Left eye exhibits no discharge.   Neck: Normal range of motion. Neck supple. No thyromegaly present.   Cardiovascular: Normal rate, regular rhythm, normal heart sounds and intact distal pulses.  Exam reveals no gallop and no friction rub.    No murmur heard.  Pulmonary/Chest: Effort normal. No respiratory distress. PT has wheezes. PT has no rales. PT exhibits tenderness.   Abdominal: Soft. Bowel sounds are normal. PT exhibits no distension and no mass. There is no tenderness. There is no rebound and no guarding.   Musculoskeletal: Normal range of motion. PT exhibits no edema and no tenderness.   Lymphadenopathy:     PT has no cervical adenopathy.   Neurological: Pt is alert and oriented to person, place, and time. Pt has normal reflexes. No cranial nerve deficit.   Skin: Skin is warm and dry. No rash noted. No erythema.   Psychiatric: PT has a normal mood and affect. Pt behavior is normal. Judgment and thought content normal.          Assessment/Plan:     1. Bronchitis    - albuterol 108 (90 Base) MCG/ACT Aero Soln inhalation aerosol; Inhale 2 Puffs by mouth every 6 hours as needed for Shortness of Breath for up to 10 days.  Dispense: 8.5 g; Refill: 0  - MethylPREDNISolone (MEDROL DOSEPAK) 4 MG Tablet Therapy Pack; Use as directed  Dispense: 21 Tab; Refill: 0  - moxifloxacin (AVELOX) 400 MG Tab; Take 1 Tab by mouth every day for 10 days.  Dispense: 10 Tab; Refill: 0    2. URI with cough and congestion    - albuterol 108 (90 Base)  MCG/ACT Aero Soln inhalation aerosol; Inhale 2 Puffs by mouth every 6 hours as needed for Shortness of Breath for up to 10 days.  Dispense: 8.5 g; Refill: 0  - MethylPREDNISolone (MEDROL DOSEPAK) 4 MG Tablet Therapy Pack; Use as directed  Dispense: 21 Tab; Refill: 0    Concern for worsening symptoms of Enterovirus D68 and worsening Bronchitis which shortly could transition to pneumonia and worsening sinus congestion and infection with powerful cough keeping pt up at night as they must sleep upright to avoid coughing fits.  Rest, fluids encouraged.  OTC decongestant for congestion/cough  AVS with medical info given.  Pt was in full understanding and agreement with the plan.  Follow-up as needed if symptoms worsen or fail to improve.

## 2019-01-24 NOTE — PATIENT INSTRUCTIONS
Enterovirus D68, Adult  Enterovirus D68 is a common virus that causes a fever, cough, and nasal congestion (upper respiratory infection).  What are the causes?  Enterovirus D68 spreads from person to person through coughing and sneezing. The virus is present in the fluid of an infected person's lungs (upper respiratory secretions). When a sick person coughs or sneezes, particles get released into the air. You can get infected by breathing in those particles. The virus may also be on any surface where these particles land. You can get infected by touching that surface and then touching your nose or mouth.  What increases the risk?  This condition is more likely to develop in:  · People who have a chronic disease, such as chronic obstructive pulmonary disease (COPD), asthma, congestive heart failure, cystic fibrosis, or diabetes.  · People who have a weakened immune system (are immunocompromised).  What are the signs or symptoms?  For most adults, symptoms of this condition are mild. Symptoms include:  · Fever.  · Nasal congestion.  · Sneezing.  · Muscle aches.  · Cough.  · Wheezing.  · Trouble breathing.  How is this diagnosed?  This condition is diagnosed based on your symptoms and a physical exam. You may also have a chest X-ray.  How is this treated?  There is no treatment or vaccine for this infection. Most people recover after resting at home for several days.  Follow these instructions at home:  · Take over-the-counter and prescription medicines only as told by your health care provider.  · Rest at home until symptoms go away. Do not go to work while you have symptoms.  · Wash your hands often with soap and water for at least 20 seconds. If soap and water are not available, use hand .  · Drink enough fluid to keep your urine clear or pale yellow.  · Keep all follow-up visits as told by your health care provider. This is important.  Contact a health care provider if:  · You have a fever.  · You have  nausea or vomiting.  · Your symptoms last longer than 3 days.  Get help right away if:  · You develop wheezing.  · You have trouble breathing.  · You cannot keep fluids down.  This information is not intended to replace advice given to you by your health care provider. Make sure you discuss any questions you have with your health care provider.  Document Released: 12/23/2014 Document Revised: 07/07/2017 Document Reviewed: 04/06/2017  [employer] Interactive Patient Education © 2017 [employer]

## 2019-01-29 ENCOUNTER — TELEPHONE (OUTPATIENT)
Dept: URGENT CARE | Facility: CLINIC | Age: 74
End: 2019-01-29

## 2019-02-04 DIAGNOSIS — E78.00 PURE HYPERCHOLESTEROLEMIA: ICD-10-CM

## 2019-02-04 DIAGNOSIS — E11.9 TYPE 2 DIABETES MELLITUS WITHOUT COMPLICATION, WITHOUT LONG-TERM CURRENT USE OF INSULIN (HCC): ICD-10-CM

## 2019-02-04 RX ORDER — ATORVASTATIN CALCIUM 20 MG/1
20 TABLET, FILM COATED ORAL DAILY
Qty: 90 TAB | Refills: 0 | Status: SHIPPED | OUTPATIENT
Start: 2019-02-04 | End: 2019-05-23 | Stop reason: SDUPTHER

## 2019-02-04 RX ORDER — GLIPIZIDE 5 MG/1
5 TABLET ORAL 2 TIMES DAILY
Qty: 180 TAB | Refills: 0 | Status: SHIPPED | OUTPATIENT
Start: 2019-02-04 | End: 2019-08-19 | Stop reason: SDUPTHER

## 2019-02-05 NOTE — TELEPHONE ENCOUNTER
From: Morgan Solis  Sent: 2/4/2019 5:32 PM PST  Subject: Medication Renewal Request    Morgan Solis would like a refill of the following medications:     metFORMIN (GLUCOPHAGE) 500 MG Tab [Kang Lofton M.D.]   Patient Comment: Please send to [address] [place] [address] [phone]     glipiZIDE (GLUCOTROL) 5 MG Tab [Kang Lofton M.D.]   Patient Comment: Please send to [place] [phone]     atorvastatin (LIPITOR) 20 MG Tab [Kang Lofton M.D.]   Patient Comment: [facility] [address] [phone]     glucose blood (ASCENSIA MICROFILL TEST) strip [Kang Lofton M.D.]   Patient Comment: Please send to [address] [place] [phone]    Preferred pharmacy: Other - [address] [place] [phone]

## 2019-02-26 NOTE — PROGRESS NOTES
Outcome: Left Message    Please transfer to Patient Outreach Team at [phone] when patient returns call.      Attempt # 2

## 2019-05-23 DIAGNOSIS — E78.00 PURE HYPERCHOLESTEROLEMIA: ICD-10-CM

## 2019-05-23 DIAGNOSIS — E11.9 TYPE 2 DIABETES MELLITUS WITHOUT COMPLICATION, WITHOUT LONG-TERM CURRENT USE OF INSULIN (HCC): ICD-10-CM

## 2019-05-23 RX ORDER — ATORVASTATIN CALCIUM 20 MG/1
TABLET, FILM COATED ORAL
Qty: 90 TAB | Refills: 0 | Status: SHIPPED | OUTPATIENT
Start: 2019-05-23 | End: 2019-05-24 | Stop reason: SDUPTHER

## 2019-05-24 DIAGNOSIS — E78.00 PURE HYPERCHOLESTEROLEMIA: ICD-10-CM

## 2019-05-24 RX ORDER — ATORVASTATIN CALCIUM 20 MG/1
TABLET, FILM COATED ORAL
Qty: 100 TAB | Refills: 0 | Status: SHIPPED | OUTPATIENT
Start: 2019-05-24 | End: 2019-08-19 | Stop reason: SDUPTHER

## 2019-06-04 ENCOUNTER — HOSPITAL ENCOUNTER (OUTPATIENT)
Facility: MEDICAL CENTER | Age: 74
End: 2019-06-04
Attending: PHYSICIAN ASSISTANT
Payer: MEDICARE

## 2019-06-04 ENCOUNTER — OFFICE VISIT (OUTPATIENT)
Dept: URGENT CARE | Facility: CLINIC | Age: 74
End: 2019-06-04
Payer: MEDICARE

## 2019-06-04 VITALS
BODY MASS INDEX: 35.03 KG/M2 | TEMPERATURE: 96.9 F | WEIGHT: 218 LBS | DIASTOLIC BLOOD PRESSURE: 76 MMHG | HEIGHT: 66 IN | HEART RATE: 81 BPM | OXYGEN SATURATION: 95 % | SYSTOLIC BLOOD PRESSURE: 122 MMHG | RESPIRATION RATE: 16 BRPM

## 2019-06-04 DIAGNOSIS — R30.0 DYSURIA: Primary | ICD-10-CM

## 2019-06-04 LAB
APPEARANCE UR: NORMAL
BILIRUB UR STRIP-MCNC: NORMAL MG/DL
COLOR UR AUTO: NORMAL
GLUCOSE UR STRIP.AUTO-MCNC: NORMAL MG/DL
KETONES UR STRIP.AUTO-MCNC: NORMAL MG/DL
LEUKOCYTE ESTERASE UR QL STRIP.AUTO: NORMAL
NITRITE UR QL STRIP.AUTO: NORMAL
PH UR STRIP.AUTO: 5 [PH] (ref 5–8)
PROT UR QL STRIP: NORMAL MG/DL
RBC UR QL AUTO: NORMAL
SP GR UR STRIP.AUTO: 1.02
UROBILINOGEN UR STRIP-MCNC: NORMAL MG/DL

## 2019-06-04 PROCEDURE — 99214 OFFICE O/P EST MOD 30 MIN: CPT | Performed by: PHYSICIAN ASSISTANT

## 2019-06-04 PROCEDURE — 81002 URINALYSIS NONAUTO W/O SCOPE: CPT | Performed by: PHYSICIAN ASSISTANT

## 2019-06-04 PROCEDURE — 87077 CULTURE AEROBIC IDENTIFY: CPT

## 2019-06-04 PROCEDURE — 87086 URINE CULTURE/COLONY COUNT: CPT

## 2019-06-04 PROCEDURE — 87186 SC STD MICRODIL/AGAR DIL: CPT

## 2019-06-04 RX ORDER — PHENAZOPYRIDINE HYDROCHLORIDE 200 MG/1
200 TABLET, FILM COATED ORAL 3 TIMES DAILY PRN
Qty: 6 TAB | Refills: 0 | Status: SHIPPED | OUTPATIENT
Start: 2019-06-04 | End: 2019-08-23

## 2019-06-04 RX ORDER — SULFAMETHOXAZOLE AND TRIMETHOPRIM 800; 160 MG/1; MG/1
1 TABLET ORAL EVERY 12 HOURS
Qty: 10 TAB | Refills: 0 | Status: SHIPPED | OUTPATIENT
Start: 2019-06-04 | End: 2019-06-09

## 2019-06-04 NOTE — PROGRESS NOTES
"Subjective:      Pt is a 73 y.o. female who presents with uti symptoms          HPI  This is a new problem. PT comes into the UC with a chief complaint of dysuria, burning on urination, urgency, frequency, and bladder pressure x 1-2 days. PT denies fevers or chills, CP, SOB, NVD, paresthesias, headaches, dizziness, change in vision, hives, or joint pain. PT states the pain is a 6/10 with burning upon urination, aching in nature and worse at night. Pt states they have not taken any RX meds for this issue. Pt denies flank or back pain as well. The pt's medication list, problem list, and allergies have been evaluated and reviewed during today's visit.      PMH:  Past Medical History:   Diagnosis Date   • Anesthesia     \"sinus drainage, throat swelling, must be intubated by mouth, needs atropine and benadryl every time I have anesthesia\"   • Arthritis     osteoarthritis, hips and fingers   • Bronchitis    • Diabetes (HCC)     oral medication   • Disorder of thyroid     hypothyroid   • Hiatus hernia syndrome     improved   • High cholesterol    • Hypertension    • Pain 17    left hip, left leg       PSH:  Past Surgical History:   Procedure Laterality Date   • HIP ARTH ANTERIOR TOTAL Left 2017    Procedure: HIP ARTHROPLASTY ANTERIOR TOTAL;  Surgeon: Jason Zepeda M.D.;  Location: SURGERY [facility];  Service:    • APPENDECTOMY     • CARPAL TUNNEL RELEASE Left    • CHOLECYSTECTOMY     • HYSTERECTOMY, VAGINAL     • TONSILLECTOMY         Fam Hx:    family history includes Arthritis in her sister; Diabetes in her father; Heart Disease in her father; Hypertension in her sister; Lung Disease in her mother; Thyroid in her sister.  Family Status   Relation Status   • Mo    • Fa  at age 54   • Sis Alive   • Alma Alive   • Alma Alive   • Alma Alive       Soc HX:  Social History     Social History   • Marital status: Single     Spouse name: N/A   • Number of children: N/A   • Years of education: N/A "     Occupational History   • Not on file.     Social History Main Topics   • Smoking status: Former Smoker     Packs/day: 1.00     Years: 35.00     Types: Cigarettes     Quit date: 7/6/2005   • Smokeless tobacco: Never Used      Comment: Quit in 2004   • Alcohol use 0.0 - 0.6 oz/week      Comment: 1 drink a week   • Drug use: No   • Sexual activity: Not Currently     Partners: Male     Other Topics Concern   • Not on file     Social History Narrative   • No narrative on file         Medications:    Current Outpatient Prescriptions:   •  atorvastatin (LIPITOR) 20 MG Tab, TAKE 1 TABLET BY MOUTH ONCE DAILY, Disp: 100 Tab, Rfl: 0  •  ASCENSIA MICROFILL TEST strip, USE 1 STRIP TO CHECK GLUCOSE ONCE DAILY *E11.9*, Disp: 100 Strip, Rfl: 0  •  metFORMIN (GLUCOPHAGE) 500 MG Tab, Take 1 Tab by mouth 2 times a day, with meals., Disp: 180 Tab, Rfl: 0  •  glipiZIDE (GLUCOTROL) 5 MG Tab, Take 1 Tab by mouth 2 times a day., Disp: 180 Tab, Rfl: 0  •  MethylPREDNISolone (MEDROL DOSEPAK) 4 MG Tablet Therapy Pack, Use as directed, Disp: 21 Tab, Rfl: 0  •  azelastine (ASTELIN) 137 MCG/SPRAY nasal spray, Spray 1 Spray in nose 2 times a day., Disp: 30 mL, Rfl: 2  •  aspirin EC 81 MG EC tablet, Take 1 Tab by mouth 2 times a day., Disp: 90 Tab, Rfl: 0  •  acetaminophen (TYLENOL) 500 MG Tab, Take 500 mg by mouth Pre-Op Once., Disp: , Rfl:   •  gabapentin (NEURONTIN) 600 MG tablet, Take 600 mg by mouth Pre-Op Once., Disp: , Rfl:   •  celecoxib (CELEBREX) 200 MG Cap, Take 200 mg by mouth Pre-Op Once., Disp: , Rfl:   •  diclofenac EC (VOLTAREN) 75 MG Tablet Delayed Response, Take 75 mg by mouth 2 times a day., Disp: , Rfl:   •  aspirin EC (ECOTRIN) 81 MG Tablet Delayed Response, Take 81 mg by mouth every morning., Disp: , Rfl:   •  Multiple Vitamins-Minerals (MULTIVITAMIN ADULT PO), Take 1 tablet by mouth every bedtime., Disp: , Rfl:   •  Krill Oil 500 MG Cap, Take 1 Cap by mouth every bedtime., Disp: , Rfl:   •  Biotin 5000 MCG Cap, Take 1  Capsule by mouth every bedtime., Disp: , Rfl:   •  Turmeric 500 MG Tab, Take 1 tablet by mouth every bedtime., Disp: , Rfl:       Allergies:  Amoxicillin; Anesthetics, warner [esters]; Cephalosporins; Codeine; Keflex; and Mobic [meloxicam]    ROS  Review of Systems   Constitutional: Negative for fever, chills and malaise/fatigue.   HENT: Negative for congestion and sore throat.    Eyes: Negative for blurred vision, double vision and photophobia.   Respiratory: Negative for cough and shortness of breath.    Cardiovascular: Negative for chest pain and palpitations.   Gastrointestinal: Negative for nausea, vomiting, abdominal pain, diarrhea and constipation.   Genitourinary: Positive for dysuria, urgency and frequency.   Musculoskeletal: Negative for joint pain and myalgias.   Skin: Negative for rash.   Neurological: Negative for dizziness, tingling and headaches.   Endo/Heme/Allergies: Does not bruise/bleed easily.   Psychiatric/Behavioral: Negative for depression. The patient is not nervous/anxious.           Objective:     There were no vitals taken for this visit.     Physical Exam      Physical Exam   Constitutional: She is oriented to person, place, and time. She appears well-developed and well-nourished. No distress.   HENT:   Head: Normocephalic and atraumatic.   Right Ear: External ear normal.   Left Ear: External ear normal.   Nose: Nose normal.   Mouth/Throat: Oropharynx is clear and moist. No oropharyngeal exudate.   Eyes: Conjunctivae normal and EOM are normal. Pupils are equal, round, and reactive to light.   Neck: Normal range of motion. Neck supple. No thyromegaly present.   Cardiovascular: Normal rate, regular rhythm, normal heart sounds and intact distal pulses.  Exam reveals no gallop and no friction rub.    No murmur heard.  Pulmonary/Chest: Effort normal and breath sounds normal. No respiratory distress. She has no wheezes. She has no rales. She exhibits no tenderness.   Abdominal: Soft. Bowel  sounds are normal. She exhibits no distension and no mass. There is no tenderness. There is no rebound and no guarding.   Genitourinary:        Pt deferred   Musculoskeletal: Normal range of motion. She exhibits no edema and no tenderness.   Lymphadenopathy:     She has no cervical adenopathy.   Neurological: She is alert and oriented to person, place, and time. She has normal reflexes. No cranial nerve deficit.   Skin: Skin is warm and dry. No rash noted. No erythema.   Psychiatric: She has a normal mood and affect. Her behavior is normal. Judgment and thought content normal.          Assessment/Plan:     1. Dysuria    - POC urinalysis  - Urine culture    Bactrim  Pyridium  Rest, fluids encouraged.  AVS with medical info given.  Pt was in full understanding and agreement with the plan.  Differential diagnosis, natural history, supportive care, and indications for immediate follow-up discussed. All questions answered. Patient agrees with the plan of care.  Follow-up as needed if symptoms worsen or fail to improve.

## 2019-06-05 DIAGNOSIS — R30.0 DYSURIA: ICD-10-CM

## 2019-06-10 ENCOUNTER — TELEPHONE (OUTPATIENT)
Dept: URGENT CARE | Facility: CLINIC | Age: 74
End: 2019-06-10

## 2019-06-10 NOTE — TELEPHONE ENCOUNTER
Called and left message with pt about urine culture results which came back positive for Klebsiella pneumoniae   >100,000 cfu/mL   I told her she could continue the abx therapy with a positive urine culture which was sensitive to the abx she was placed on.  Encouraged Pt to call back with questions.  Edgardo Barfield PA-C

## 2019-08-18 DIAGNOSIS — E11.9 TYPE 2 DIABETES MELLITUS WITHOUT COMPLICATION, WITHOUT LONG-TERM CURRENT USE OF INSULIN (HCC): ICD-10-CM

## 2019-08-18 DIAGNOSIS — E78.00 PURE HYPERCHOLESTEROLEMIA: ICD-10-CM

## 2019-08-19 RX ORDER — GLIPIZIDE 5 MG/1
5 TABLET ORAL 2 TIMES DAILY
Qty: 180 TAB | Refills: 0 | OUTPATIENT
Start: 2019-08-19

## 2019-08-19 RX ORDER — ATORVASTATIN CALCIUM 20 MG/1
TABLET, FILM COATED ORAL
Qty: 100 TAB | Refills: 0 | OUTPATIENT
Start: 2019-08-19

## 2019-08-21 ENCOUNTER — TELEPHONE (OUTPATIENT)
Dept: MEDICAL GROUP | Facility: MEDICAL CENTER | Age: 74
End: 2019-08-21

## 2019-08-21 DIAGNOSIS — E78.00 PURE HYPERCHOLESTEROLEMIA: ICD-10-CM

## 2019-08-21 DIAGNOSIS — E11.9 TYPE 2 DIABETES MELLITUS WITHOUT COMPLICATION, WITHOUT LONG-TERM CURRENT USE OF INSULIN (HCC): ICD-10-CM

## 2019-08-21 RX ORDER — GLIPIZIDE 5 MG/1
5 TABLET ORAL 2 TIMES DAILY
Qty: 200 TAB | Refills: 3 | Status: SHIPPED | OUTPATIENT
Start: 2019-08-21 | End: 2019-08-23 | Stop reason: SDUPTHER

## 2019-08-21 NOTE — TELEPHONE ENCOUNTER
ESTABLISHED PATIENT PRE-VISIT PLANNING     Patient was NOT contacted to complete PVP.     Note: Patient will not be contacted if there is no indication to call.     1.  Reviewed notes from the last few office visits within the medical group: Yes    2.  If any orders were placed at last visit or intended to be done for this visit (i.e. 6 mos follow-up), do we have Results/Consult Notes?        •  Labs - Labs were not ordered at last office visit.   Note: If patient appointment is for lab review and patient did not complete labs, check with provider if OK to reschedule patient until labs completed.       •  Imaging - Imaging was not ordered at last office visit.       •  Referrals - No referrals were ordered at last office visit.    3. Is this appointment scheduled as a Hospital Follow-Up? No    4.  Immunizations were updated in Epic using WebIZ?: Epic matches WebIZ       •  Web Iz Recommendations: FLU, PNEUMOVAX (PPSV23), TDAP, VARICELLA (Chicken Pox)  and SHINGRIX (Shingles)    5.  Patient is due for the following Health Maintenance Topics:   Health Maintenance Due   Topic Date Due   • HEPATITIS C SCREENING  1945   • RETINAL SCREENING  11/26/1965   • URINE ACR / MICROALBUMIN  11/26/1965   • IMM HEP B VACCINE (1 of 3 - Risk 3-dose series) 11/26/1966   • IMM DTaP/Tdap/Td Vaccine (1 - Tdap) 11/26/1966   • MAMMOGRAM  11/26/1987   • COLONOSCOPY  11/26/1997   • IMM ZOSTER VACCINES (1 of 2) 11/26/1997   • BONE DENSITY  11/26/2012   • A1C SCREENING  02/11/2016   • FASTING LIPID PROFILE  08/11/2016   • DIABETES MONOFILAMENT / LE EXAM  03/28/2018   • IMM PNEUMOCOCCAL VACCINE: 65+ Years (2 of 2 - PPSV23) 06/07/2018   • SERUM CREATININE  06/23/2018

## 2019-08-23 ENCOUNTER — OFFICE VISIT (OUTPATIENT)
Dept: MEDICAL GROUP | Facility: MEDICAL CENTER | Age: 74
End: 2019-08-23
Payer: MEDICARE

## 2019-08-23 VITALS
OXYGEN SATURATION: 97 % | TEMPERATURE: 96.8 F | HEART RATE: 86 BPM | SYSTOLIC BLOOD PRESSURE: 128 MMHG | DIASTOLIC BLOOD PRESSURE: 80 MMHG | HEIGHT: 66 IN | WEIGHT: 221 LBS | BODY MASS INDEX: 35.52 KG/M2

## 2019-08-23 DIAGNOSIS — J01.10 ACUTE NON-RECURRENT FRONTAL SINUSITIS: ICD-10-CM

## 2019-08-23 DIAGNOSIS — Z12.31 ENCOUNTER FOR SCREENING MAMMOGRAM FOR BREAST CANCER: ICD-10-CM

## 2019-08-23 DIAGNOSIS — I10 ESSENTIAL HYPERTENSION: ICD-10-CM

## 2019-08-23 DIAGNOSIS — E11.69 HYPERLIPIDEMIA ASSOCIATED WITH TYPE 2 DIABETES MELLITUS (HCC): ICD-10-CM

## 2019-08-23 DIAGNOSIS — E11.9 TYPE 2 DIABETES MELLITUS WITHOUT COMPLICATION, WITHOUT LONG-TERM CURRENT USE OF INSULIN (HCC): ICD-10-CM

## 2019-08-23 DIAGNOSIS — Z12.11 COLON CANCER SCREENING: ICD-10-CM

## 2019-08-23 DIAGNOSIS — Z23 NEED FOR VACCINATION: ICD-10-CM

## 2019-08-23 DIAGNOSIS — E78.5 HYPERLIPIDEMIA ASSOCIATED WITH TYPE 2 DIABETES MELLITUS (HCC): ICD-10-CM

## 2019-08-23 LAB
HBA1C MFR BLD: 7.1 % (ref 0–5.6)
INT CON NEG: NEGATIVE
INT CON POS: POSITIVE

## 2019-08-23 PROCEDURE — 90732 PPSV23 VACC 2 YRS+ SUBQ/IM: CPT | Performed by: FAMILY MEDICINE

## 2019-08-23 PROCEDURE — 8041 PR SCP AHA: Performed by: FAMILY MEDICINE

## 2019-08-23 PROCEDURE — 83036 HEMOGLOBIN GLYCOSYLATED A1C: CPT | Performed by: FAMILY MEDICINE

## 2019-08-23 PROCEDURE — 99214 OFFICE O/P EST MOD 30 MIN: CPT | Mod: 25 | Performed by: FAMILY MEDICINE

## 2019-08-23 PROCEDURE — G0009 ADMIN PNEUMOCOCCAL VACCINE: HCPCS | Performed by: FAMILY MEDICINE

## 2019-08-23 RX ORDER — ATORVASTATIN CALCIUM 20 MG/1
TABLET, FILM COATED ORAL
Qty: 100 TAB | Refills: 3 | Status: SHIPPED | OUTPATIENT
Start: 2019-08-23

## 2019-08-23 RX ORDER — GLIPIZIDE 5 MG/1
5 TABLET ORAL 2 TIMES DAILY
Qty: 200 TAB | Refills: 3 | Status: SHIPPED | OUTPATIENT
Start: 2019-08-23

## 2019-08-23 RX ORDER — LISINOPRIL 5 MG/1
5 TABLET ORAL DAILY
Qty: 90 TAB | Refills: 3 | Status: SHIPPED | OUTPATIENT
Start: 2019-08-23

## 2019-08-23 RX ORDER — AZITHROMYCIN 250 MG/1
TABLET, FILM COATED ORAL
Qty: 6 TAB | Refills: 0 | Status: SHIPPED | OUTPATIENT
Start: 2019-08-23 | End: 2019-08-28

## 2019-08-23 RX ORDER — LISINOPRIL 5 MG/1
5 TABLET ORAL DAILY
COMMUNITY
End: 2019-08-23 | Stop reason: SDUPTHER

## 2019-08-23 ASSESSMENT — ACTIVITIES OF DAILY LIVING (ADL): BATHING_REQUIRES_ASSISTANCE: 0

## 2019-08-23 ASSESSMENT — ENCOUNTER SYMPTOMS: GENERAL WELL-BEING: GOOD

## 2019-08-23 ASSESSMENT — PATIENT HEALTH QUESTIONNAIRE - PHQ9: CLINICAL INTERPRETATION OF PHQ2 SCORE: 0

## 2019-08-23 NOTE — ASSESSMENT & PLAN NOTE
Last A1c was 2015 at 7.4. She has been taking metformin 500 mg twice daily and glipizide 5 mg twice daily, although when taking the combination of metformin and glipizide she gets hypoglycemic symptoms. She is taking glipizide 5 mg twice daily regularly and takes metformin 500 mg about 2-3 times per week based on high readings. Most of the time her fasting blood sugar is 130.  A1c today is 7.1.  She has an upcoming eye exam, last eye exam was 2 years ago, but we never received the records.

## 2019-08-23 NOTE — PROGRESS NOTES
Subjective:   Morgan Solis is a 73 y.o. female here today for diabetes    Type 2 diabetes mellitus without complication  Last A1c was 2015 at 7.4. She has been taking metformin 500 mg twice daily and glipizide 5 mg twice daily, although when taking the combination of metformin and glipizide she gets hypoglycemic symptoms. She is taking glipizide 5 mg twice daily regularly and takes metformin 500 mg about 2-3 times per week based on high readings. Most of the time her fasting blood sugar is 130.  A1c today is 7.1.  She has an upcoming eye exam, last eye exam was 2 years ago, but we never received the records.    Hyperlipidemia associated with type 2 diabetes mellitus (HCC)  Patient is on atorvastatin 20 mg nightly.    Essential hypertension  Takes the lisinopril 5 mg if blood pressure over 135/85 or if it is close. Takes blood pressure once daily, most of the time her blood pressure is 125 or less systolic. Usually only needs to take 1 lisinopril per week.         Patient is complaining of sinus pressure and congestion that has been going on for several days.  She is trying to use Flonase without any improvement.  She has history of recurrent sinus infections and believes she has a acute sinus infection again.  PingMD has worked well for her in the past.      Current medicines (including changes today)  Current Outpatient Medications   Medication Sig Dispense Refill   • lisinopril (PRINIVIL) 5 MG Tab Take 5 mg by mouth every day.     • azithromycin (ZITHROMAX) 250 MG Tab 2 tabs by mouth day 1, 1 tab by mouth days 2-5 6 Tab 0   • metFORMIN (GLUCOPHAGE) 500 MG Tab Take 1 Tab by mouth 2 times a day, with meals. 200 Tab 3   • glipiZIDE (GLUCOTROL) 5 MG Tab Take 1 Tab by mouth 2 times a day. 200 Tab 3   • atorvastatin (LIPITOR) 20 MG Tab TAKE 1 TABLET BY MOUTH ONCE DAILY 100 Tab 0   • ASCENSIA MICROFILL TEST strip USE 1 STRIP TO CHECK GLUCOSE ONCE DAILY *E11.9* 100 Strip 0   • acetaminophen (TYLENOL) 500 MG Tab Take  "500 mg by mouth Pre-Op Once.     • Multiple Vitamins-Minerals (MULTIVITAMIN ADULT PO) Take 1 tablet by mouth every bedtime.       No current facility-administered medications for this visit.      She  has a past medical history of Anesthesia, Arthritis, Bronchitis (2012), Diabetes (HCC), Disorder of thyroid, Hiatus hernia syndrome, High cholesterol, Hypertension, and Pain (6/23/17).    ROS   No numbness, no vision changes, no fever       Objective:     /80 (BP Location: Right arm, Patient Position: Sitting)   Pulse 86   Temp 36 °C (96.8 °F)   Ht 1.676 m (5' 6\")   Wt 100.2 kg (221 lb)   SpO2 97%  Body mass index is 35.67 kg/m².   Physical Exam:  Constitutional: Alert, no distress.  Skin: Warm, dry, good turgor, no rashes in visible areas.  Eye: Equal, round and reactive, conjunctiva clear, lids normal.  Psych: Alert and oriented x3, normal affect and mood.    Monofilament testing with a 10 gram force: sensation intact: intact bilaterally  Visual Inspection: Feet without maceration, ulcers, fissures.  Pedal pulses: intact bilaterally      Assessment and Plan:   The following treatment plan was discussed    1. Type 2 diabetes mellitus without complication, without long-term current use of insulin ([facility])  Controlled.  Continue glipizide.  Follow-up in 6 months for in clinic A1c.  Check labs now and call with results.  - Diabetic Monofilament Lower Extremity Exam    2. Hyperlipidemia associated with type 2 diabetes mellitus (HCC)  Check labs and call with results.  Continue atorvastatin.    3. Essential hypertension  Controlled.  Continue lisinopril.    4. Acute non-recurrent frontal sinusitis  New problem.  Start Z-Victoriano.  - azithromycin (ZITHROMAX) 250 MG Tab; 2 tabs by mouth day 1, 1 tab by mouth days 2-5  Dispense: 6 Tab; Refill: 0    5. Colon cancer screening  - COLOGUARD (FIT DNA)    6. Encounter for screening mammogram for breast cancer  Mammogram ordered.    7. Need for vaccination  - Pneumococal " Polysaccharide Vaccine 23-Valent =>1YO SQ/IM        Followup: Return in about 6 months (around 2/23/2020) for Diabetes.

## 2019-08-23 NOTE — ASSESSMENT & PLAN NOTE
Takes the lisinopril 5 mg if blood pressure over 135/85 or if it is close. Takes blood pressure once daily, most of the time her blood pressure is 125 or less systolic. Usually only needs to take 1 lisinopril per week.

## 2019-08-30 ENCOUNTER — HOSPITAL ENCOUNTER (OUTPATIENT)
Dept: LAB | Facility: MEDICAL CENTER | Age: 74
End: 2019-08-30
Attending: FAMILY MEDICINE
Payer: MEDICARE

## 2019-08-30 DIAGNOSIS — E11.9 TYPE 2 DIABETES MELLITUS WITHOUT COMPLICATION, WITHOUT LONG-TERM CURRENT USE OF INSULIN (HCC): ICD-10-CM

## 2019-08-30 LAB
ALBUMIN SERPL BCP-MCNC: 4 G/DL (ref 3.2–4.9)
ALBUMIN/GLOB SERPL: 1.2 G/DL
ALP SERPL-CCNC: 137 U/L (ref 30–99)
ALT SERPL-CCNC: 72 U/L (ref 2–50)
ANION GAP SERPL CALC-SCNC: 9 MMOL/L (ref 0–11.9)
AST SERPL-CCNC: 58 U/L (ref 12–45)
BASOPHILS # BLD AUTO: 1.7 % (ref 0–1.8)
BASOPHILS # BLD: 0.11 K/UL (ref 0–0.12)
BILIRUB SERPL-MCNC: 0.6 MG/DL (ref 0.1–1.5)
BUN SERPL-MCNC: 18 MG/DL (ref 8–22)
CALCIUM SERPL-MCNC: 9.4 MG/DL (ref 8.5–10.5)
CHLORIDE SERPL-SCNC: 104 MMOL/L (ref 96–112)
CHOLEST SERPL-MCNC: 162 MG/DL (ref 100–199)
CO2 SERPL-SCNC: 27 MMOL/L (ref 20–33)
CREAT SERPL-MCNC: 0.83 MG/DL (ref 0.5–1.4)
CREAT UR-MCNC: 179.4 MG/DL
EOSINOPHIL # BLD AUTO: 0.33 K/UL (ref 0–0.51)
EOSINOPHIL NFR BLD: 5.2 % (ref 0–6.9)
ERYTHROCYTE [DISTWIDTH] IN BLOOD BY AUTOMATED COUNT: 43.7 FL (ref 35.9–50)
EST. AVERAGE GLUCOSE BLD GHB EST-MCNC: 166 MG/DL
GLOBULIN SER CALC-MCNC: 3.3 G/DL (ref 1.9–3.5)
GLUCOSE SERPL-MCNC: 136 MG/DL (ref 65–99)
HBA1C MFR BLD: 7.4 % (ref 0–5.6)
HCT VFR BLD AUTO: 43 % (ref 37–47)
HDLC SERPL-MCNC: 43 MG/DL
HGB BLD-MCNC: 14.3 G/DL (ref 12–16)
IMM GRANULOCYTES # BLD AUTO: 0.01 K/UL (ref 0–0.11)
IMM GRANULOCYTES NFR BLD AUTO: 0.2 % (ref 0–0.9)
LDLC SERPL CALC-MCNC: 94 MG/DL
LYMPHOCYTES # BLD AUTO: 2.01 K/UL (ref 1–4.8)
LYMPHOCYTES NFR BLD: 31.4 % (ref 22–41)
MCH RBC QN AUTO: 31.9 PG (ref 27–33)
MCHC RBC AUTO-ENTMCNC: 33.3 G/DL (ref 33.6–35)
MCV RBC AUTO: 96 FL (ref 81.4–97.8)
MICROALBUMIN UR-MCNC: 0.8 MG/DL
MICROALBUMIN/CREAT UR: 4 MG/G (ref 0–30)
MONOCYTES # BLD AUTO: 0.47 K/UL (ref 0–0.85)
MONOCYTES NFR BLD AUTO: 7.3 % (ref 0–13.4)
NEUTROPHILS # BLD AUTO: 3.47 K/UL (ref 2–7.15)
NEUTROPHILS NFR BLD: 54.2 % (ref 44–72)
NRBC # BLD AUTO: 0 K/UL
NRBC BLD-RTO: 0 /100 WBC
PLATELET # BLD AUTO: 280 K/UL (ref 164–446)
PMV BLD AUTO: 9.5 FL (ref 9–12.9)
POTASSIUM SERPL-SCNC: 4.2 MMOL/L (ref 3.6–5.5)
PROT SERPL-MCNC: 7.3 G/DL (ref 6–8.2)
RBC # BLD AUTO: 4.48 M/UL (ref 4.2–5.4)
SODIUM SERPL-SCNC: 140 MMOL/L (ref 135–145)
T4 FREE SERPL-MCNC: 0.87 NG/DL (ref 0.53–1.43)
TRIGL SERPL-MCNC: 127 MG/DL (ref 0–149)
TSH SERPL DL<=0.005 MIU/L-ACNC: 5.89 UIU/ML (ref 0.38–5.33)
WBC # BLD AUTO: 6.4 K/UL (ref 4.8–10.8)

## 2019-08-30 PROCEDURE — 85025 COMPLETE CBC W/AUTO DIFF WBC: CPT

## 2019-08-30 PROCEDURE — 82043 UR ALBUMIN QUANTITATIVE: CPT

## 2019-08-30 PROCEDURE — 80061 LIPID PANEL: CPT

## 2019-08-30 PROCEDURE — 82570 ASSAY OF URINE CREATININE: CPT

## 2019-08-30 PROCEDURE — 84443 ASSAY THYROID STIM HORMONE: CPT

## 2019-08-30 PROCEDURE — 83036 HEMOGLOBIN GLYCOSYLATED A1C: CPT

## 2019-08-30 PROCEDURE — 80053 COMPREHEN METABOLIC PANEL: CPT

## 2019-08-30 PROCEDURE — 36415 COLL VENOUS BLD VENIPUNCTURE: CPT

## 2019-08-30 PROCEDURE — 84439 ASSAY OF FREE THYROXINE: CPT

## 2019-09-03 ENCOUNTER — TELEPHONE (OUTPATIENT)
Dept: MEDICAL GROUP | Facility: MEDICAL CENTER | Age: 74
End: 2019-09-03

## 2019-09-03 DIAGNOSIS — E03.9 HYPOTHYROIDISM, UNSPECIFIED TYPE: ICD-10-CM

## 2019-09-03 RX ORDER — LEVOTHYROXINE SODIUM 0.05 MG/1
50 TABLET ORAL
Qty: 60 TAB | Refills: 1 | Status: SHIPPED | OUTPATIENT
Start: 2019-09-03 | End: 2019-12-08 | Stop reason: SDUPTHER

## 2019-09-03 NOTE — TELEPHONE ENCOUNTER
Prescription for levothyroxine 50 mcg daily sent to [facility]. We should check thyroid lab in 6 weeks to ensure her current dose is working well.  Labs ordered.  Kang Lofton M.D.

## 2019-09-03 NOTE — TELEPHONE ENCOUNTER
----- Message from Kang Lofton M.D. sent at 9/3/2019  7:24 AM PDT -----  Please notify patient that her kidney function, blood counts, cholesterol are normal.  Her thyroid is borderline underfunctioning, we will want to keep an eye on this when she gets her next blood tests done in about 6 months.  Liver function is slightly increased again.  Please make sure she is avoiding alcohol, exercising regularly and eating a low-fat diet.    Kang Lofton M.D.

## 2019-10-01 ENCOUNTER — IMMUNIZATION (OUTPATIENT)
Dept: SOCIAL WORK | Facility: CLINIC | Age: 74
End: 2019-10-01
Payer: MEDICARE

## 2019-10-01 DIAGNOSIS — Z23 NEED FOR VACCINATION: ICD-10-CM

## 2019-10-01 PROCEDURE — 90662 IIV NO PRSV INCREASED AG IM: CPT | Performed by: REGISTERED NURSE

## 2019-10-01 PROCEDURE — G0008 ADMIN INFLUENZA VIRUS VAC: HCPCS | Performed by: REGISTERED NURSE

## 2019-10-24 ENCOUNTER — HOSPITAL ENCOUNTER (OUTPATIENT)
Dept: LAB | Facility: MEDICAL CENTER | Age: 74
End: 2019-10-24
Attending: FAMILY MEDICINE
Payer: MEDICARE

## 2019-10-24 DIAGNOSIS — E03.9 HYPOTHYROIDISM, UNSPECIFIED TYPE: ICD-10-CM

## 2019-10-24 LAB — TSH SERPL DL<=0.005 MIU/L-ACNC: 4.01 UIU/ML (ref 0.38–5.33)

## 2019-10-24 PROCEDURE — 84443 ASSAY THYROID STIM HORMONE: CPT

## 2019-10-24 PROCEDURE — 36415 COLL VENOUS BLD VENIPUNCTURE: CPT

## 2019-12-08 DIAGNOSIS — E03.9 HYPOTHYROIDISM, UNSPECIFIED TYPE: ICD-10-CM

## 2019-12-09 RX ORDER — LEVOTHYROXINE SODIUM 0.05 MG/1
50 TABLET ORAL
Qty: 90 TAB | Refills: 3 | Status: SHIPPED | OUTPATIENT
Start: 2019-12-09

## 2021-01-15 DIAGNOSIS — Z23 NEED FOR VACCINATION: ICD-10-CM

## 2021-12-18 NOTE — TELEPHONE ENCOUNTER
Refill done. Patient is due for annual appointment. Please have patient schedule.  Kang Lofton M.D.       Low back pain with left-sided sciatica

## (undated) DEVICE — PROTECTOR ULNA NERVE - (36PR/CA)

## (undated) DEVICE — DRAPE SRG LG BCK TBL DISP - 10/CA

## (undated) DEVICE — NEPTUNE 4 PORT MANIFOLD - (20/PK)

## (undated) DEVICE — SET LEADWIRE 5 LEAD BEDSIDE DISPOSABLE ECG (1SET OF 5/EA)

## (undated) DEVICE — HEAD HOLDER JUNIOR/ADULT

## (undated) DEVICE — SET EXTENSION WITH 2 PORTS (48EA/CA) ***PART #2C8610 IS A SUBSTITUTE*****

## (undated) DEVICE — ELECTRODE 850 FOAM ADHESIVE - HYDROGEL RADIOTRNSPRNT (50/PK)

## (undated) DEVICE — KIT ANESTHESIA W/CIRCUIT & 3/LT BAG W/FILTER (20EA/CA)

## (undated) DEVICE — MASK ANESTHESIA ADULT  - (100/CA)

## (undated) DEVICE — LACTATED RINGERS INJ 1000 ML - (14EA/CA 60CA/PF)

## (undated) DEVICE — DERMABOND ADVANCED - (12EA/BX)

## (undated) DEVICE — SODIUM CHL IRRIGATION 0.9% 1000ML (12EA/CA)

## (undated) DEVICE — TUBE E-T HI-LO CUFF 6.5MM (10EA/BX)

## (undated) DEVICE — SUTURE GENERAL

## (undated) DEVICE — PEN SKIN MARKER W/RULER - (50EA/BX)

## (undated) DEVICE — CANISTER SUCTION 3000ML MECHANICAL FILTER AUTO SHUTOFF MEDI-VAC NONSTERILE LF DISP  (40EA/CA)

## (undated) DEVICE — GLOVE BIOGEL SZ 7 SURGICAL PF LTX - (50PR/BX 4BX/CA)

## (undated) DEVICE — GLOVE BIOGEL SZ 8 SURGICAL PF LTX - (50PR/BX 4BX/CA)

## (undated) DEVICE — HANDPIECE 10FT INTPLS SCT PLS IRRIGATION HAND CONTROL SET (6/PK)

## (undated) DEVICE — DRAPE C-ARM LARGE 41IN X 74 IN - (10/BX 2BX/CA)

## (undated) DEVICE — DRESSING TRANSPARENT FILM TEGADERM 4 X 4.75" (50EA/BX)"

## (undated) DEVICE — GLOVE BIOGEL INDICATOR SZ 8 SURGICAL PF LTX - (50/BX 4BX/CA)

## (undated) DEVICE — LEAD SET 6 DISP. EKG NIHON KOHDEN (100EA/CA)

## (undated) DEVICE — GLOVE BIOGEL SZ 6.5 SURGICAL PF LTX (50PR/BX 4BX/CA)

## (undated) DEVICE — BLADE 90X12.5X1.37MM SAW SAGITTAL

## (undated) DEVICE — KIT ROOM DECONTAMINATION

## (undated) DEVICE — ELECTRODE DUAL RETURN W/ CORD - (50/PK)

## (undated) DEVICE — CHLORAPREP 26 ML APPLICATOR - ORANGE TINT(25/CA)

## (undated) DEVICE — SUCTION INSTRUMENT YANKAUER BULBOUS TIP W/O VENT (50EA/CA)

## (undated) DEVICE — TUBING CLEARLINK DUO-VENT - C-FLO (48EA/CA)

## (undated) DEVICE — GOWN WARMING STANDARD FLEX - (30/CA)

## (undated) DEVICE — SUTURE 1 VICRYL PLUS CTX - 8 X 18 INCH (12/BX)

## (undated) DEVICE — SODIUM CHL. IRRIGATION 0.9% 3000ML (4EA/CA 65CA/PF)

## (undated) DEVICE — GLOVE BIOGEL PI INDICATOR SZ 7.0 SURGICAL PF LF - (50/BX 4BX/CA)

## (undated) DEVICE — DRAPE SURGICAL U 77X120 - (10/CA)

## (undated) DEVICE — SENSOR SPO2 NEO LNCS ADHESIVE (20/BX) SEE USER NOTES

## (undated) DEVICE — TIP INTPLS HFLO ML ORFC BTRY - (12/CS)  FOR SURGILAV

## (undated) DEVICE — LENS/HOOD FOR SPACESUIT - (32/PK) PEEL AWAY FACE

## (undated) DEVICE — PACK TOTAL HIP - (1/CA)

## (undated) DEVICE — SUTURE 3-0 MONOCRYL PLUS PS-1 - 27 INCH (36/BX)

## (undated) DEVICE — SUTURE 0 SILK TIES (36PK/BX)